# Patient Record
Sex: FEMALE | Race: OTHER | HISPANIC OR LATINO | Employment: UNEMPLOYED | URBAN - METROPOLITAN AREA
[De-identification: names, ages, dates, MRNs, and addresses within clinical notes are randomized per-mention and may not be internally consistent; named-entity substitution may affect disease eponyms.]

---
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
## 2019-11-19 ENCOUNTER — OFFICE VISIT (OUTPATIENT)
Dept: OBGYN CLINIC | Facility: CLINIC | Age: 19
End: 2019-11-19

## 2019-11-19 VITALS
HEIGHT: 64 IN | HEART RATE: 66 BPM | DIASTOLIC BLOOD PRESSURE: 74 MMHG | WEIGHT: 144 LBS | BODY MASS INDEX: 24.59 KG/M2 | SYSTOLIC BLOOD PRESSURE: 118 MMHG

## 2019-11-19 DIAGNOSIS — E28.2 PCOS (POLYCYSTIC OVARIAN SYNDROME): Primary | ICD-10-CM

## 2019-11-19 PROCEDURE — 99203 OFFICE O/P NEW LOW 30 MIN: CPT | Performed by: OBSTETRICS & GYNECOLOGY

## 2019-11-19 RX ORDER — NORGESTIMATE AND ETHINYL ESTRADIOL 0.25-0.035
1 KIT ORAL DAILY
Qty: 90 TABLET | Refills: 3 | Status: SHIPPED | OUTPATIENT
Start: 2019-11-19

## 2019-11-19 NOTE — PROGRESS NOTES
Assessment/Plan:    PCOS (polycystic ovarian syndrome)  2/3 [place] criteria with irregular menses and cysts found on US in clinic  - Start [address] on risk of diabetes and hyperlipidemia  -A1C this year 5 9  She does have fam hx of diabetes  Hold of on metformin for now  Encouraged healthy diet and exercise  - Lipid panel this year show slightly elevated cholesterol at 203  Hold off on any treatment now  Encouraged lifestyle modifications  Problem List Items Addressed This Visit        Endocrine    PCOS (polycystic ovarian syndrome) - Primary     2/3 [place] criteria with irregular menses and cysts found on US in clinic  - [address] on risk of diabetes and hyperlipidemia  -A1C this year 5 9  She does have fam hx of diabetes  Hold of on metformin for now  Encouraged healthy diet and exercise  - Lipid panel this year show slightly elevated cholesterol at 203  Hold off on any treatment now  Encouraged lifestyle modifications  Relevant Medications    norgestimate-ethinyl estradiol (ORTHO-CYCLEN) 0 25-35 MG-MCG per tablet            Subjective:      Patient ID: Danielle Adame is a 20 y o  female  Kishore Hurley is a 20year old who presents to the clinic as a new patient  She was previously seen by [address]  for PCOS  Today she says she is just worried because her periods are irregular and hasn't had her period since August  She has a history of oligomenorrhea and irregular menses  She was previously on Provera every other month but says hasn't taken it for a while  She is not on any other birthcontrol  She is currently sexually active with her   She has no other complaint  She says she had acne as a teenager but that has cleared up  She denies hirsutism         The following portions of the patient's history were reviewed and updated as appropriate: allergies, current medications, past family history, past medical history, past social history, past surgical history and problem list     Review of Systems   Constitutional: Negative for chills and fever  Genitourinary: Positive for menstrual problem  Negative for decreased urine volume, difficulty urinating, dyspareunia, dysuria, enuresis, flank pain, frequency, genital sores, hematuria, pelvic pain, urgency, vaginal bleeding, vaginal discharge and vaginal pain  Objective:      /74 (BP Location: Left arm, Patient Position: Sitting, Cuff Size: Adult)   Pulse 66   Ht 5' 4" (1 626 m)   Wt 65 3 kg (144 lb)   LMP 08/25/2019 (LMP Unknown)   BMI 24 72 kg/m²          Physical Exam   Constitutional: No distress  Abdominal: Soft  Skin: She is not diaphoretic  A little skin darkening on back of the neck but no acanthosis nigricans any where else  Nursing note and vitals reviewed

## 2019-11-19 NOTE — ASSESSMENT & PLAN NOTE
2/3 rotter[place] criteria with irregular menses and cysts found on US in clinic  - [address] on risk of diabetes and hyperlipidemia  -A1C this year 5 9  She does have fam hx of diabetes  Hold of on metformin for now  Encouraged healthy diet and exercise  - Lipid panel this year show slightly elevated cholesterol at 203  Hold off on any treatment now  Encouraged lifestyle modifications

## 2020-03-09 ENCOUNTER — ANNUAL EXAM (OUTPATIENT)
Dept: OBGYN CLINIC | Facility: CLINIC | Age: 20
End: 2020-03-09

## 2020-03-09 VITALS
WEIGHT: 152 LBS | HEIGHT: 63 IN | DIASTOLIC BLOOD PRESSURE: 82 MMHG | BODY MASS INDEX: 26.93 KG/M2 | SYSTOLIC BLOOD PRESSURE: 136 MMHG | HEART RATE: 76 BPM

## 2020-03-09 DIAGNOSIS — E28.2 PCOS (POLYCYSTIC OVARIAN SYNDROME): ICD-10-CM

## 2020-03-09 DIAGNOSIS — Z01.419 ENCOUNTER FOR GYNECOLOGICAL EXAMINATION WITHOUT ABNORMAL FINDING: Primary | ICD-10-CM

## 2020-03-09 PROCEDURE — 99395 PREV VISIT EST AGE 18-39: CPT | Performed by: NURSE PRACTITIONER

## 2020-03-09 PROCEDURE — G0145 SCR C/V CYTO,THINLAYER,RESCR: HCPCS | Performed by: NURSE PRACTITIONER

## 2020-03-09 NOTE — PROGRESS NOTES
ASSESSMENT & PLAN: Laura Smith is a 23 y  o G0 obstetric history on file  with normal gynecologic exam  Menses started today  1   Routine well woman exam done today  2  Pap was done today  1st Pap  Will call results  Current ASCCP Guidelines reviewed  If negative due in 3 years  3  STD testing  was not done  Patient declined  4  Gardasil recommendations reviewed   Patient thinks she is vaccinated, will check with parent and  Let us know  5  The following were reviewed in today's visit: breast self exam, STD testing, HIV risk factors and prevention, family planning choices, adequate intake of calcium and vitamin D, exercise and healthy diet  6  Review to monitor menses  Call if abnormal or skipping menses  Reviewed important to get a menses at least every 3 months  7  Patient to establish a PCP- phone number for [facility] given    CC:  Annual Gynecologic Examination    HPI: Laura Smith is a 23 y o  G0  obstetric history on file  who presents for annual gynecologic examination  Patient was seen 11/19/2019 and  has PCOS, she was to start [address] [place] [address] ,  was counseled on risk of diabetes and hyperlipidemia  Her A1c was 5 9 and does have a family history of diabetes,  Healthy diet and exercise was encouraged  Cholesterol was slightly elevated at 203  patient reports she never started Sprintec because her periods started to become more regular and were occurring monthly, however she reports she did not have one in February but did have spotting last week  she reports is okay if  she got pregnant  She is taking a vitamin-D a 1000 mg daily but not a daily multivitamin,  I encouraged her to take 1 with folic acid  patient was previously seen by [address] for PCOS      Health Maintenance:    She wears her seatbelt routinely  She does not perform regular monthly self breast exams  She feels safe at home  No past medical history on file      No past surgical history on file     OB/Gyn History:    Pt has menstrual issues,  As in HPI    History of sexually transmitted infection: No   History of abnormal pap smears: No 1st Pap smear  Patient is currently sexually active  The current method of family planning is none  OB History    None         No family history on file  Social History:  Social History     Socioeconomic History    Marital status: Single     Spouse name: Not on file    Number of children: Not on file    Years of education: Not on file    Highest education level: Not on file   Occupational History    Not on file   Social Needs    Financial resource strain: Not on file    Food insecurity:     Worry: Not on file     Inability: Not on file    Transportation needs:     Medical: Not on file     Non-medical: Not on file   Tobacco Use    Smoking status: Never Smoker    Smokeless tobacco: Never Used   Substance and Sexual Activity    Alcohol use: No    Drug use: No    Sexual activity: Yes     Birth control/protection: None   Lifestyle    Physical activity:     Days per week: Not on file     Minutes per session: Not on file    Stress: Not on file   Relationships    Social connections:     Talks on phone: Not on file     Gets together: Not on file     Attends [employer] service: Not on file     Active member of club or organization: Not on file     Attends meetings of clubs or organizations: Not on file     Relationship status: Not on file    Intimate partner violence:     Fear of current or ex partner: Not on file     Emotionally abused: Not on file     Physically abused: Not on file     Forced sexual activity: Not on file   Other Topics Concern    Not on file   Social History Narrative    Not on file     Patient is     Patient is currently employed     No Known Allergies      Current Outpatient Medications:     dicyclomine (BENTYL) 20 mg tablet, Take 1 tablet by mouth 3 (three) times a day as needed (cramping or abdominal pain) for up to 10 days, Disp: 30 tablet, Rfl: 0    norgestimate-ethinyl estradiol (ORTHO-CYCLEN) 0 25-35 MG-MCG per tablet, Take 1 tablet by mouth daily, Disp: 90 tablet, Rfl: 3    Review of Systems:  Constitutional :no fever, feels well, no tiredness, no recent weight gain or loss  ENT: no ear ache, no loss of hearing, no nosebleeds or nasal discharge, no sore throat or hoarseness  Cardiovascular: no complaints of slow or fast heart beat, no chest pain, no palpitations, no leg claudication or lower extremity edema  Respiratory: no complaints of shortness of shortness of breath, no STARK  Breasts:no complaints of breast pain, breast lump, or nipple discharge  Gastrointestinal: no complaints of abdominal pain, constipation, nausea, vomiting, or diarrhea or bloody stools  Genitourinary : no complaints of dysuria, incontinence, pelvic pain, no dysmenorrhea, vaginal discharge or abnormal vaginal bleeding and as noted in HPI  Musculoskeletal: no complaints of arthralgia, no myalgia, no joint swelling or stiffness, no limb pain or swelling  Integumentary: no complaints of skin rash or lesion, itching or dry skin  Neurological: no complaints of headache, no confusion, no numbness or tingling, no dizziness or fainting    Objective      There were no vitals taken for this visit      General:   appears stated age, cooperative, alert normal mood and affect   Neck: normal, supple,trachea midline, no masses   Heart: regular rate and rhythm, S1, S2 normal, no murmur, click, rub or gallop   Lungs: clear to auscultation bilaterally   Breasts: normal appearance, no masses or tenderness, Inspection negative, No nipple retraction or dimpling, No nipple discharge or bleeding, No axillary or supraclavicular adenopathy, Normal to palpation without dominant masses, Taught monthly breast self examination   Abdomen: soft, non-tender, without masses or organomegaly   Vulva: normal female genitalia, Bartholin's, Urethra, [place] normal   Vagina: normal vagina, no discharge, exudate, lesion, or erythema,  Currently has menses- small to moderate flow   Urethra: normal   Cervix: Normal, no discharge  PAP done  Nontender  currently has menses   Uterus: normal size, contour, position, consistency, mobility, non-tender, anteverted and mobile   Adnexa: normal adnexa and no mass, fullness, tenderness   Lymphatic palpation of lymph nodes in neck, axilla, groin and/or other locations: no lymphadenopathy or masses noted   Skin normal skin turgor and no rashes     Psychiatric orientation to person, place, and time: normal  mood and affect: normal

## 2020-03-09 NOTE — PATIENT INSTRUCTIONS
Polycystic Ovarian Syndrome   WHAT YOU NEED TO KNOW:   Polycystic ovarian syndrome (PCOS) is a hormone disorder that causes cysts to form on your ovaries  Cysts are bumps that are filled with fluid  The cysts can prevent your ovaries from working correctly  DISCHARGE INSTRUCTIONS:   Medicines:   · Birth control pills: These medicines have female hormones, and may decrease male hormone levels  Birth control pills may control your periods, prevent cysts, or cause them to shrink  They also help decrease your risk of endometrial cancer and correct abnormal bleeding  · Hypoglycemic medicines: These help to lower your blood sugar levels and decrease insulin resistance  They are also used to lower male hormone levels and help you ovulate  · NSAIDs:  These medicines decrease swelling and pain  You can buy NSAIDs without a doctor's order  Ask your healthcare provider which medicine is right for you, and how much to take  Take as directed  NSAIDs can cause stomach bleeding or kidney problems if not taken correctly  · Take your medicine as directed  Contact your healthcare provider if you think your medicine is not helping or if you have side effects  Tell him of her if you are allergic to any medicine  Keep a list of the medicines, vitamins, and herbs you take  Include the amounts, and when and why you take them  Bring the list or the pill bottles to follow-up visits  Carry your medicine list with you in case of an emergency  Follow up with your healthcare provider or gynecologist as directed: You may need to return to have more tests  Write down your questions so you remember to ask them during your visits  Manage your blood sugar and blood pressure: Your healthcare provider may want you to check your blood sugar levels and blood pressure at home  Keep a record and bring this to your follow-up visits  Blood sugar is measured with a glucose monitor  The monitor tests a small drop of blood   Blood pressure is measured with a cuff that you put on your arm and tighten  Ask for more information on how to measure your blood sugar and blood pressure  Manage your symptoms:   · Maintain a healthy weight:  Ask your healthcare provider how much you should weigh  Ask him to help you create a weight loss plan if you are overweight  Weight loss may help reduce the complications of PCOS  · Exercise:  Ask your healthcare provider about the best exercise plan for you  Exercise can help decrease blood sugar and blood pressure  It may also help with weight loss  · Eat a variety of healthy foods:  Healthy foods include fruits, vegetables, whole-grain breads, low-fat dairy products, beans, lean meats, and fish  A dietitian may help you plan meals that are lower in carbohydrates to help you manage your blood sugar levels  Too much carbohydrate at one time can raise your blood sugar to a high level  Contact your healthcare provider or gynecologist if:   · You have a fever  · You feel weak or tired  · You have pain during sex  · Your pain is worse or does not go away after you take your pain medicine  · You have trouble urinating or emptying your bladder completely  · You have questions or concerns about your condition or care  Return to the emergency department if:   · You have a severe headache or feel dizzy  · You vomit multiple times and cannot keep food or liquids down  · You have blurred or double vision  · Your breath has a fruity sweet smell, or you feel short of breath  · You have severe lower abdominal or pelvic pain  © 2017 [address] Information is for End User's use only and may not be sold, redistributed or otherwise used for commercial purposes  All illustrations and images included in CareNotes® are the copyrighted property of [employer]  or Martin Salazar  The above information is an  only   It is not intended as medical advice for individual conditions or treatments  Talk to your doctor, nurse or pharmacist before following any medical regimen to see if it is safe and effective for you

## 2020-03-13 LAB
LAB AP GYN PRIMARY INTERPRETATION: NORMAL
Lab: NORMAL

## 2020-03-17 ENCOUNTER — TELEPHONE (OUTPATIENT)
Dept: OBGYN CLINIC | Facility: CLINIC | Age: 20
End: 2020-03-17

## 2021-11-23 ENCOUNTER — HOSPITAL ENCOUNTER (EMERGENCY)
Facility: HOSPITAL | Age: 21
Discharge: HOME | End: 2021-11-24
Attending: EMERGENCY MEDICINE
Payer: COMMERCIAL

## 2021-11-23 DIAGNOSIS — R10.9 ABDOMINAL PAIN: Primary | ICD-10-CM

## 2021-11-23 PROCEDURE — 99284 EMERGENCY DEPT VISIT MOD MDM: CPT

## 2021-11-24 ENCOUNTER — APPOINTMENT (EMERGENCY)
Dept: ULTRASOUND IMAGING | Facility: HOSPITAL | Age: 21
End: 2021-11-24
Payer: COMMERCIAL

## 2021-11-24 VITALS
HEIGHT: 64 IN | SYSTOLIC BLOOD PRESSURE: 108 MMHG | OXYGEN SATURATION: 100 % | HEART RATE: 88 BPM | DIASTOLIC BLOOD PRESSURE: 64 MMHG | BODY MASS INDEX: 22.2 KG/M2 | WEIGHT: 130 LBS | RESPIRATION RATE: 18 BRPM | TEMPERATURE: 98.9 F

## 2021-11-24 LAB
ALBUMIN SERPL BCP-MCNC: 3.8 G/DL (ref 3.5–5)
ALP SERPL-CCNC: 64 U/L (ref 46–116)
ALT SERPL W P-5'-P-CCNC: 29 U/L (ref 12–78)
ANION GAP SERPL CALCULATED.3IONS-SCNC: 10 MMOL/L (ref 4–13)
AST SERPL W P-5'-P-CCNC: 18 U/L (ref 5–45)
BASOPHILS # BLD AUTO: 0.05 THOUSANDS/ΜL (ref 0–0.1)
BASOPHILS NFR BLD AUTO: 0 % (ref 0–1)
BILIRUB SERPL-MCNC: 0.1 MG/DL (ref 0.2–1)
BILIRUB UR QL STRIP: NEGATIVE
BUN SERPL-MCNC: 9 MG/DL (ref 5–25)
CALCIUM SERPL-MCNC: 9.5 MG/DL (ref 8.3–10.1)
CHLORIDE SERPL-SCNC: 104 MMOL/L (ref 100–108)
CLARITY UR: NORMAL
CO2 SERPL-SCNC: 24 MMOL/L (ref 21–32)
COLOR UR: YELLOW
CREAT SERPL-MCNC: 0.61 MG/DL (ref 0.6–1.3)
EOSINOPHIL # BLD AUTO: 0.09 THOUSAND/ΜL (ref 0–0.61)
EOSINOPHIL NFR BLD AUTO: 1 % (ref 0–6)
ERYTHROCYTE [DISTWIDTH] IN BLOOD BY AUTOMATED COUNT: 13.2 % (ref 11.6–15.1)
GFR SERPL CREATININE-BSD FRML MDRD: 129 ML/MIN/1.73SQ M
GLUCOSE SERPL-MCNC: 93 MG/DL (ref 65–140)
GLUCOSE UR STRIP-MCNC: NEGATIVE MG/DL
HCT VFR BLD AUTO: 38 % (ref 34.8–46.1)
HGB BLD-MCNC: 12.2 G/DL (ref 11.5–15.4)
HGB UR QL STRIP.AUTO: NEGATIVE
IMM GRANULOCYTES # BLD AUTO: 0.05 THOUSAND/UL (ref 0–0.2)
IMM GRANULOCYTES NFR BLD AUTO: 0 % (ref 0–2)
KETONES UR STRIP-MCNC: NEGATIVE MG/DL
LEUKOCYTE ESTERASE UR QL STRIP: NEGATIVE
LIPASE SERPL-CCNC: 124 U/L (ref 73–393)
LYMPHOCYTES # BLD AUTO: 2.72 THOUSANDS/ΜL (ref 0.6–4.47)
LYMPHOCYTES NFR BLD AUTO: 21 % (ref 14–44)
MCH RBC QN AUTO: 27.5 PG (ref 26.8–34.3)
MCHC RBC AUTO-ENTMCNC: 32.1 G/DL (ref 31.4–37.4)
MCV RBC AUTO: 86 FL (ref 82–98)
MONOCYTES # BLD AUTO: 0.93 THOUSAND/ΜL (ref 0.17–1.22)
MONOCYTES NFR BLD AUTO: 7 % (ref 4–12)
NEUTROPHILS # BLD AUTO: 8.86 THOUSANDS/ΜL (ref 1.85–7.62)
NEUTS SEG NFR BLD AUTO: 71 % (ref 43–75)
NITRITE UR QL STRIP: NEGATIVE
NRBC BLD AUTO-RTO: 0 /100 WBCS
PH UR STRIP.AUTO: 7 [PH]
PLATELET # BLD AUTO: 380 THOUSANDS/UL (ref 149–390)
PMV BLD AUTO: 10 FL (ref 8.9–12.7)
POTASSIUM SERPL-SCNC: 4.2 MMOL/L (ref 3.5–5.3)
PROT SERPL-MCNC: 8 G/DL (ref 6.4–8.2)
PROT UR STRIP-MCNC: NEGATIVE MG/DL
RBC # BLD AUTO: 4.44 MILLION/UL (ref 3.81–5.12)
SODIUM SERPL-SCNC: 138 MMOL/L (ref 136–145)
SP GR UR STRIP.AUTO: 1.02 (ref 1–1.03)
UROBILINOGEN UR QL STRIP.AUTO: 0.2 E.U./DL
WBC # BLD AUTO: 12.7 THOUSAND/UL (ref 4.31–10.16)

## 2021-11-24 PROCEDURE — 80053 COMPREHEN METABOLIC PANEL: CPT | Performed by: EMERGENCY MEDICINE

## 2021-11-24 PROCEDURE — 36415 COLL VENOUS BLD VENIPUNCTURE: CPT | Performed by: EMERGENCY MEDICINE

## 2021-11-24 PROCEDURE — 87591 N.GONORRHOEAE DNA AMP PROB: CPT | Performed by: EMERGENCY MEDICINE

## 2021-11-24 PROCEDURE — 81003 URINALYSIS AUTO W/O SCOPE: CPT | Performed by: EMERGENCY MEDICINE

## 2021-11-24 PROCEDURE — 99284 EMERGENCY DEPT VISIT MOD MDM: CPT | Performed by: EMERGENCY MEDICINE

## 2021-11-24 PROCEDURE — 87086 URINE CULTURE/COLONY COUNT: CPT | Performed by: EMERGENCY MEDICINE

## 2021-11-24 PROCEDURE — 76801 OB US < 14 WKS SINGLE FETUS: CPT

## 2021-11-24 PROCEDURE — 85025 COMPLETE CBC W/AUTO DIFF WBC: CPT | Performed by: EMERGENCY MEDICINE

## 2021-11-24 PROCEDURE — 76705 ECHO EXAM OF ABDOMEN: CPT

## 2021-11-24 PROCEDURE — 87491 CHLMYD TRACH DNA AMP PROBE: CPT | Performed by: EMERGENCY MEDICINE

## 2021-11-24 PROCEDURE — 83690 ASSAY OF LIPASE: CPT | Performed by: EMERGENCY MEDICINE

## 2021-11-25 LAB
BACTERIA UR CULT: NORMAL
C TRACH DNA SPEC QL NAA+PROBE: NEGATIVE
N GONORRHOEA DNA SPEC QL NAA+PROBE: NEGATIVE

## 2023-04-20 PROBLEM — E55.9 VITAMIN D DEFICIENCY: Status: ACTIVE | Noted: 2019-02-21

## 2023-04-20 PROBLEM — Z78.9 NONIMMUNE TO HEPATITIS B VIRUS: Status: ACTIVE | Noted: 2021-10-13

## 2023-04-20 PROBLEM — E78.2 MIXED HYPERLIPIDEMIA: Status: ACTIVE | Noted: 2019-02-21

## 2023-04-20 PROBLEM — Z28.310 COVID-19 VACCINE SERIES DECLINED: Status: ACTIVE | Noted: 2021-11-01

## 2023-04-20 PROBLEM — Z28.21 COVID-19 VACCINE SERIES DECLINED: Status: ACTIVE | Noted: 2021-11-01

## 2023-07-17 DIAGNOSIS — N93.9 ABNORMAL UTERINE BLEEDING (AUB): ICD-10-CM

## 2023-07-17 RX ORDER — ACETAMINOPHEN AND CODEINE PHOSPHATE 120; 12 MG/5ML; MG/5ML
SOLUTION ORAL
Qty: 84 TABLET | Refills: 0 | OUTPATIENT
Start: 2023-07-17

## 2023-07-17 NOTE — TELEPHONE ENCOUNTER
Please call patient. I received an automatic refill request for patient's birth control pill that I started her on back in April for irregular bleeding. Our plan was to follow-up after she completed pelvic ultrasound which I do not see was done. I am not sure how patient is doing on the birth control pill and if her periods are regulated and if she desires to continue or not. Please triage patient check in with her to see how her periods/bleeding is and if still problems needs to follow-up.

## 2024-12-20 ENCOUNTER — ULTRASOUND (OUTPATIENT)
Dept: OBGYN CLINIC | Facility: CLINIC | Age: 24
End: 2024-12-20
Payer: COMMERCIAL

## 2024-12-20 VITALS
BODY MASS INDEX: 21.85 KG/M2 | HEIGHT: 64 IN | WEIGHT: 128 LBS | DIASTOLIC BLOOD PRESSURE: 80 MMHG | SYSTOLIC BLOOD PRESSURE: 122 MMHG

## 2024-12-20 DIAGNOSIS — O21.9 NAUSEA AND VOMITING IN PREGNANCY: ICD-10-CM

## 2024-12-20 DIAGNOSIS — N91.2 AMENORRHEA: Primary | ICD-10-CM

## 2024-12-20 PROCEDURE — 99214 OFFICE O/P EST MOD 30 MIN: CPT

## 2024-12-20 PROCEDURE — 76817 TRANSVAGINAL US OBSTETRIC: CPT

## 2024-12-20 RX ORDER — ONDANSETRON 4 MG/1
4 TABLET, ORALLY DISINTEGRATING ORAL EVERY 6 HOURS PRN
Qty: 20 TABLET | Refills: 2 | Status: SHIPPED | OUTPATIENT
Start: 2024-12-20

## 2024-12-20 NOTE — PROGRESS NOTES
Assessment/Plan:  Diagnoses and all orders for this visit:    Amenorrhea  -     Ambulatory Referral to Maternal Fetal Medicine; Future  -     AMB  OB < 14 weeks single or first gestation level 1    Nausea and vomiting in pregnancy  -     ondansetron (ZOFRAN-ODT) 4 mg disintegrating tablet; Take 1 tablet (4 mg total) by mouth every 6 (six) hours as needed for nausea or vomiting        - Viable IUP @ 7w 3d EGA  - BRANDI 2025  - Continue PNV  - Patient to call for concerns  - RTO ~ 10-12 weeks for OB intake  - call MFM for 13 week NT scan/genetic testing.       Subjective:       Patient ID: Neisha Atwood 2000        Neisha Atwood is a 24 y.o.  presenting to the office for pregnancy confirmation. Patient's last menstrual period was 10/11/2024 (exact date). , placing her at 10w0d today with BRANDI of 2025. She is feeling well, but nauseous.     Nausea:yes, discussed vitamin B6 25-50mg QD-BID, Unisom, ginger snaps/tea, Zofran  Vomiting: no  Bleeding: no  Cramping: no  Headaches: no  Fatigue: no  Constipation: no  Blood type, if known: O+       OB History    Para Term  AB Living   2 1 1 0 0 0   SAB IAB Ectopic Multiple Live Births   0 0 0 0 0      # Outcome Date GA Lbr Reid/2nd Weight Sex Type Anes PTL Lv   2 Current            1 Term      Vag-Spont            The following portions of the patient's history were reviewed and updated as appropriate: allergies, current medications, past family history, past medical history, past social history, past surgical history, and problem list.    Allergies:  Patient has no known allergies.    Medications:    Current Outpatient Medications:     ondansetron (ZOFRAN-ODT) 4 mg disintegrating tablet, Take 1 tablet (4 mg total) by mouth every 6 (six) hours as needed for nausea or vomiting, Disp: 20 tablet, Rfl: 2    Prenatal Vit-Iron Carbonyl-FA (prenatal multivitamin) TABS, Take 1 tablet by mouth daily, Disp: , Rfl:     dicyclomine (BENTYL) 20 mg tablet, Take  "1 tablet by mouth 3 (three) times a day as needed (cramping or abdominal pain) for up to 10 days, Disp: 30 tablet, Rfl: 0      Objective:    Visit Vitals  /80   Ht 5' 3.5\" (1.613 m)   Wt 58.1 kg (128 lb)   LMP 10/11/2024 (Exact Date)   BMI 22.32 kg/m²   OB Status Pregnant   Smoking Status Never   BSA 1.61 m²     GEN: The patient was alert and oriented x3, pleasant well-appearing female in no acute distress.   PULM: nonlabored respirations  MSK: Normal gait  : WNL  Skin: warm, dry  Neuro: no focal deficits  Psych: normal affect and judgement, cooperative    Ultrasound:     Viability US     Gestational sac: present               Location: intrauterine  Yolk sac: present  Fetal pole: present               CRL: 1.26 cm = 7w3d  Cardiac activity: present               Rate: 148 bpm     Ovaries: normal appearing bilaterally  Cul de sac: absence of free fluid  Uterus: normal in appearance           Ultrasound Probe Disinfection    A transvaginal ultrasound was performed.   Prior to use, disinfection was performed with High Level Disinfection Process (Trophon)  Probe serial number [id number]: [id number] was used    I have spent a total time of 35 minutes in caring for this patient on the day of the visit/encounter including Diagnostic results, Risks and benefits of tx options, Instructions for management, Patient and family education, Documenting in the medical record, Reviewing / ordering tests, medicine, procedures  , and Obtaining or reviewing history  .     Gabby Sims PA-C  12/20/24  10:26 AM   "

## 2024-12-20 NOTE — PROGRESS NOTES
The patient has slight cramping. No bleeding.     The patient has a lot of nausea. No vomiting.     No dizziness or headache.

## 2025-01-08 ENCOUNTER — OB ABSTRACT (OUTPATIENT)
Dept: OBGYN CLINIC | Facility: CLINIC | Age: 25
End: 2025-01-08

## 2025-01-10 NOTE — PATIENT INSTRUCTIONS
.Congratulations!! Please review our Pregnancy Essential Guide and [facility] L&D Virtual tour from our networks website.     [facility] Pregnancy Essentials Guide  [facility] ([email or web address])     [facility] (Ratio)

## 2025-01-13 ENCOUNTER — RESULTS FOLLOW-UP (OUTPATIENT)
Dept: LABOR AND DELIVERY | Facility: HOSPITAL | Age: 25
End: 2025-01-13

## 2025-01-13 ENCOUNTER — INITIAL PRENATAL (OUTPATIENT)
Dept: OBGYN CLINIC | Facility: CLINIC | Age: 25
End: 2025-01-13

## 2025-01-13 ENCOUNTER — APPOINTMENT (OUTPATIENT)
Dept: LAB | Facility: AMBULARY SURGERY CENTER | Age: 25
End: 2025-01-13
Payer: COMMERCIAL

## 2025-01-13 VITALS
HEIGHT: 64 IN | SYSTOLIC BLOOD PRESSURE: 122 MMHG | DIASTOLIC BLOOD PRESSURE: 82 MMHG | BODY MASS INDEX: 22.16 KG/M2 | WEIGHT: 129.8 LBS

## 2025-01-13 DIAGNOSIS — Z87.59 HISTORY OF GESTATIONAL HYPERTENSION: ICD-10-CM

## 2025-01-13 DIAGNOSIS — Z34.81 PRENATAL CARE, SUBSEQUENT PREGNANCY, FIRST TRIMESTER: ICD-10-CM

## 2025-01-13 DIAGNOSIS — Z31.430 ENCOUNTER OF FEMALE FOR TESTING FOR GENETIC DISEASE CARRIER STATUS FOR PROCREATIVE MANAGEMENT: ICD-10-CM

## 2025-01-13 DIAGNOSIS — Z36.9 ENCOUNTER FOR ANTENATAL SCREENING: ICD-10-CM

## 2025-01-13 DIAGNOSIS — Z36.9 ENCOUNTER FOR ANTENATAL SCREENING: Primary | ICD-10-CM

## 2025-01-13 LAB
ABO GROUP BLD: NORMAL
ALBUMIN SERPL BCG-MCNC: 4.1 G/DL (ref 3.5–5)
ALP SERPL-CCNC: 52 U/L (ref 34–104)
ALT SERPL W P-5'-P-CCNC: 10 U/L (ref 7–52)
AMORPH URATE CRY URNS QL MICRO: ABNORMAL
ANION GAP SERPL CALCULATED.3IONS-SCNC: 7 MMOL/L (ref 4–13)
AST SERPL W P-5'-P-CCNC: 14 U/L (ref 13–39)
BACTERIA UR QL AUTO: ABNORMAL /HPF
BASOPHILS # BLD AUTO: 0.04 THOUSANDS/ΜL (ref 0–0.1)
BASOPHILS NFR BLD AUTO: 0 % (ref 0–1)
BILIRUB SERPL-MCNC: 0.47 MG/DL (ref 0.2–1)
BILIRUB UR QL STRIP: NEGATIVE
BLD GP AB SCN SERPL QL: NEGATIVE
BUN SERPL-MCNC: 7 MG/DL (ref 5–25)
CALCIUM SERPL-MCNC: 10.1 MG/DL (ref 8.4–10.2)
CHLORIDE SERPL-SCNC: 103 MMOL/L (ref 96–108)
CLARITY UR: ABNORMAL
CO2 SERPL-SCNC: 26 MMOL/L (ref 21–32)
COLOR UR: YELLOW
CREAT SERPL-MCNC: 0.47 MG/DL (ref 0.6–1.3)
CREAT UR-MCNC: 117 MG/DL
EOSINOPHIL # BLD AUTO: 0.08 THOUSAND/ΜL (ref 0–0.61)
EOSINOPHIL NFR BLD AUTO: 1 % (ref 0–6)
ERYTHROCYTE [DISTWIDTH] IN BLOOD BY AUTOMATED COUNT: 13.2 % (ref 11.6–15.1)
GFR SERPL CREATININE-BSD FRML MDRD: 137 ML/MIN/1.73SQ M
GLUCOSE SERPL-MCNC: 87 MG/DL (ref 65–140)
GLUCOSE UR STRIP-MCNC: NEGATIVE MG/DL
HBV SURFACE AB SER-ACNC: 7.13 MIU/ML
HBV SURFACE AG SER QL: NORMAL
HCT VFR BLD AUTO: 38 % (ref 34.8–46.1)
HCV AB SER QL: NORMAL
HGB BLD-MCNC: 12.3 G/DL (ref 11.5–15.4)
HGB UR QL STRIP.AUTO: NEGATIVE
HIV 1+2 AB+HIV1 P24 AG SERPL QL IA: NORMAL
HIV 2 AB SERPL QL IA: NORMAL
HIV1 AB SERPL QL IA: NORMAL
HIV1 P24 AG SERPL QL IA: NORMAL
IMM GRANULOCYTES # BLD AUTO: 0.06 THOUSAND/UL (ref 0–0.2)
IMM GRANULOCYTES NFR BLD AUTO: 1 % (ref 0–2)
KETONES UR STRIP-MCNC: NEGATIVE MG/DL
LEUKOCYTE ESTERASE UR QL STRIP: NEGATIVE
LYMPHOCYTES # BLD AUTO: 1.39 THOUSANDS/ΜL (ref 0.6–4.47)
LYMPHOCYTES NFR BLD AUTO: 13 % (ref 14–44)
MCH RBC QN AUTO: 28 PG (ref 26.8–34.3)
MCHC RBC AUTO-ENTMCNC: 32.4 G/DL (ref 31.4–37.4)
MCV RBC AUTO: 87 FL (ref 82–98)
MONOCYTES # BLD AUTO: 0.64 THOUSAND/ΜL (ref 0.17–1.22)
MONOCYTES NFR BLD AUTO: 6 % (ref 4–12)
NEUTROPHILS # BLD AUTO: 8.61 THOUSANDS/ΜL (ref 1.85–7.62)
NEUTS SEG NFR BLD AUTO: 79 % (ref 43–75)
NITRITE UR QL STRIP: NEGATIVE
NON-SQ EPI CELLS URNS QL MICRO: ABNORMAL /HPF
NRBC BLD AUTO-RTO: 0 /100 WBCS
PH UR STRIP.AUTO: 8 [PH]
PLATELET # BLD AUTO: 375 THOUSANDS/UL (ref 149–390)
PMV BLD AUTO: 10.1 FL (ref 8.9–12.7)
POTASSIUM SERPL-SCNC: 4.1 MMOL/L (ref 3.5–5.3)
PROT SERPL-MCNC: 7 G/DL (ref 6.4–8.4)
PROT UR STRIP-MCNC: ABNORMAL MG/DL
PROT UR-MCNC: 7.4 MG/DL
PROT/CREAT UR: 0.1 MG/G{CREAT} (ref 0–0.1)
RBC # BLD AUTO: 4.39 MILLION/UL (ref 3.81–5.12)
RBC #/AREA URNS AUTO: ABNORMAL /HPF
RH BLD: POSITIVE
RUBV IGG SERPL IA-ACNC: 16.9 IU/ML
SODIUM SERPL-SCNC: 136 MMOL/L (ref 135–147)
SP GR UR STRIP.AUTO: 1.02 (ref 1–1.03)
SPECIMEN EXPIRATION DATE: NORMAL
TREPONEMA PALLIDUM IGG+IGM AB [PRESENCE] IN SERUM OR PLASMA BY IMMUNOASSAY: NORMAL
URATE SERPL-MCNC: 3.7 MG/DL (ref 2–7.5)
UROBILINOGEN UR STRIP-ACNC: <2 MG/DL
VZV IGG SER QL IA: ABNORMAL
WBC # BLD AUTO: 10.82 THOUSAND/UL (ref 4.31–10.16)
WBC #/AREA URNS AUTO: ABNORMAL /HPF

## 2025-01-13 PROCEDURE — 87389 HIV-1 AG W/HIV-1&-2 AB AG IA: CPT

## 2025-01-13 PROCEDURE — 80053 COMPREHEN METABOLIC PANEL: CPT

## 2025-01-13 PROCEDURE — 86787 VARICELLA-ZOSTER ANTIBODY: CPT

## 2025-01-13 PROCEDURE — 36415 COLL VENOUS BLD VENIPUNCTURE: CPT

## 2025-01-13 PROCEDURE — 86706 HEP B SURFACE ANTIBODY: CPT

## 2025-01-13 PROCEDURE — 86900 BLOOD TYPING SEROLOGIC ABO: CPT

## 2025-01-13 PROCEDURE — 81001 URINALYSIS AUTO W/SCOPE: CPT

## 2025-01-13 PROCEDURE — 84550 ASSAY OF BLOOD/URIC ACID: CPT

## 2025-01-13 PROCEDURE — 85025 COMPLETE CBC W/AUTO DIFF WBC: CPT

## 2025-01-13 PROCEDURE — 86901 BLOOD TYPING SEROLOGIC RH(D): CPT

## 2025-01-13 PROCEDURE — 82570 ASSAY OF URINE CREATININE: CPT

## 2025-01-13 PROCEDURE — 86780 TREPONEMA PALLIDUM: CPT

## 2025-01-13 PROCEDURE — 87340 HEPATITIS B SURFACE AG IA: CPT

## 2025-01-13 PROCEDURE — 84156 ASSAY OF PROTEIN URINE: CPT

## 2025-01-13 PROCEDURE — 87086 URINE CULTURE/COLONY COUNT: CPT

## 2025-01-13 PROCEDURE — OBC

## 2025-01-13 PROCEDURE — 83020 HEMOGLOBIN ELECTROPHORESIS: CPT

## 2025-01-13 PROCEDURE — 86803 HEPATITIS C AB TEST: CPT

## 2025-01-13 PROCEDURE — 86762 RUBELLA ANTIBODY: CPT

## 2025-01-13 PROCEDURE — 86850 RBC ANTIBODY SCREEN: CPT

## 2025-01-13 RX ORDER — OMEGA-3S/DHA/EPA/FISH OIL/D3 300MG-1000
400 CAPSULE ORAL DAILY
COMMUNITY

## 2025-01-13 NOTE — PROGRESS NOTES
.  OB INTAKE INTERVIEW  Patient is 24 y.o. who presents for OB intake at 10.6 wks  She is accompanied by herself during this encounter  The father of her baby (Tyler) is involved in the pregnancy and is 26 years old.      Last Menstrual Period: 10/11/2024  Ultrasound: Measured 7 weeks 3 days on 2024  Estimated Date of Delivery: 2025 confirmed by 7.3 week US    Signs/Symptoms of Pregnancy  Current pregnancy symptoms: nausea, tired, sore breast , Acid reflux at night when she  lies down   Constipation no  Headaches no  Cramping/spotting no  PICA cravings no    Diabetes-  Body mass index is 22.63 kg/m².  If patient has 1 or more, please order early 1 hour GTT  History of GDM no  BMI >35 no  History of PCOS or current metformin use (should stop for 7 days prior to 1hr GTT unless pre-existing diabetes)  yes , not on Metformin  History of LGA/macrosomic infant (4000g/9lbs) no    If patient has 2 or more, please order early 1 hour GTT  BMI>30 no  AMA no  First degree relative with type 2 diabetes no  History of chronic HTN, hyperlipidemia, elevated A1C YES past hyperlipidemia   High risk race (, , ,  or ) YES     Hypertension- if you answer yes to any of the following, please order baseline preeclampsia labs (cbc, comprehensive metabolic panel, urine protein creatinine ratio, uric acid)  History of of chronic HTN YES  History of gestational HTN YES  History of preeclampsia, eclampsia, or HELLP syndrome no, not sure if it was pre E, or GHTN  History of diabetes no  History of lupus,sjogrens syndrome, kidney disease no    Thyroid- if yes order TSH with reflex T4  History of thyroid disease no    Bleeding Disorder or Hx of DVT-patient or first degree relative with history of. Order the following if not done previously.   (Factor V, antithrombin III, prothrombin gene mutation, protein C and S Ag, lupus anticoagulant, anticardiolipin, beta-2  glycoprotein)   no    OB/GYN-  History of abnormal pap smear no       Date of last pap smear 2023  History of HPV no  History of Herpes/HSV no  History of other STI (gonorrhea, chlamydia, trich) no  History of prior  YES  History of prior  no  History of  delivery prior to 36 weeks 6 days no  History of Varicella or Vaccination  Had vaccine   History of blood transfusion no  Ok for blood transfusion yes    Substance screening-   History of tobacco use no  Currently using tobacco no  Substance Use Screen Level  N/A    MRSA Screening-   Does the pt have a hx of MRSA? no    Immunizations:  Influenza vaccine given this season  No  Discussed Tdap vaccine  yes  Discussed COVID Vaccine  yes    Genetic/[facility]-  Do you or your partner have a history of any of the following in yourselves or first degree relatives?  Cystic fibrosis no  Spinal muscular atrophy no  Hemoglobinopathy/Sickle Cell/Thalassemia no  Fragile X Intellectual Disability no        Discuss CF,SMA, Hgb Electrophoresis  Carrier Screening being completed once in a lifetime as a standard of care lab test. Ordered   Appointment for Nuchal Translucency Ultrasound at [facility] scheduled for 2025      Interview education  [facility] Pregnancy Essentials Book reviewed, discussed and attached to their AVS  yes    Nurse/Family Partnership- patient may qualify  yes referral placed  yes    Prenatal lab work scripts yes  Extra labs ordered:  HGB Electrophoresis, CF,SMA, PRE E blood work     Aspirin/Preeclampsia Screen    Risk Level Risk Factor Recommendation   LOW Prior Uncomplicated full-term delivery YES No Aspirin recommendation        MODERATE Nulliparity no Recommend low-dose aspirin if     BMI>30 no 2 or more moderate risk factors    Family History Preeclampsia (mother/sister) no     35yr old or greater no     Black Race, Concern for SDOH/Low Socioeconomic no     IVF Pregnancy  no     Personal History Risks (low birth weight, prior adverse preg  outcome, >10yr preg interval) no         HIGH History of Preeclampsia YES GHTN  Recommend low-dose aspirin if     Multifetal gestation no 1 or more high risk factors    Chronic HTN no     Type 1 or 2 Diabetes no     Renal Disease no     Autoimmune Disease  no      Contraindications to ASA therapy:  NSAID/ ASA allergy: no  Nasal polyps: no  Asthma with history of ASA induced bronchospasm: no  Relative contraindications:  History of GI bleed: no  Active peptic ulcer disease: no  Severe hepatic dysfunction: no    Patient should be recommended to take ASA 162mg during this pregnancy from 12-36wks to lower her risk of preeclampsia:  Discussed           The patient has a history now or in prior pregnancy notable for:  Postpartum GHTN,Difficulty breast feeding       Details that I feel the provider should be aware of: .This is a planned and welcomed pregnancy for parents. Patient is feeling generally well. Patient is experiencing some  nausea, tiredness,  breast  soreness , Acid reflux at night ,when she  lies down .OTC medications and diet were discussed. Patient is a  . Patient had a prior full term delivery at 39W4d  gestation , she had a vaginal birth , she had a baby girl  who weighed 7lb 1.8 oz, she delivered with LVH.She had complication of gestational hypertension- postpartum and Difficulty breast feeding. PIH lab work were ordered.She has a past medical history of  PCOS, (no Metformin), vitamin D deficiency, which she takes a vitamin D supplement daily ,. Both Neisha and her  Tyler are of  Descent, a HGB Electrophoresis was ordered Patient knows to call OB for symptoms and how to contact her nurse navigator. Patient was made aware to have lab orders completed before next appointment. Patient denies any questions at this point and verbalizes understanding.  PN1 visit scheduled. The patient was oriented to our practice, the navigator role, reviewed delivering physicians  [facility] for Delivery. All questions were answered.    Interviewed by: Electronically Signed by Sharita Butterfield LPN ,OB Nurse navigator

## 2025-01-14 ENCOUNTER — NURSE TRIAGE (OUTPATIENT)
Dept: OTHER | Facility: OTHER | Age: 25
End: 2025-01-14

## 2025-01-14 LAB — BACTERIA UR CULT: NORMAL

## 2025-01-15 LAB
HGB A MFR BLD: 2.3 % (ref 1.8–3.2)
HGB A MFR BLD: 97.7 % (ref 96.4–98.8)
HGB F MFR BLD: 0 % (ref 0–2)
HGB FRACT BLD-IMP: NORMAL
HGB S MFR BLD: 0 %

## 2025-01-15 NOTE — TELEPHONE ENCOUNTER
"Reason for Disposition   [1] Sinus congestion as part of a cold AND [2] present < 10 days   MILD abdominal pain    Answer Assessment - Initial Assessment Questions  1. LOCATION: \"Where does it hurt?\"         Nose and head    2. ONSET: \"When did the sinus pain start?\"  (e.g., hours, days)         Yesterday    3. SEVERITY: \"How bad is the pain?\"   (Scale 1-10; mild, moderate or severe)        Mild    4. RECURRENT SYMPTOM: \"Have you ever had sinus problems before?\" If Yes, ask: \"When was the last time?\" and \"What happened that time?\"         Denies    5. NASAL CONGESTION: \"Is the nose blocked?\" If Yes, ask: \"Can you open it or must you breathe through your mouth?\"        Congestion, can breath but cannot get anything from nose    6. NASAL DISCHARGE: \"Do you have discharge from your nose?\" If so ask, \"What color?\"        Denies getting anything out    7. FEVER: \"Do you have a fever?\" If Yes, ask: \"What is it, how was it measured, and when did it start?\"         Denies    8. OTHER SYMPTOMS: \"Do you have any other symptoms?\" (e.g., sore throat, cough, earache, difficulty breathing)        Itchy throat, sneezing, headache, lower abdomen dull pain. Denies difficulty breathing.    9. PREGNANCY: \"Is there any chance you are pregnant?\" \"When was your last menstrual period?\"        11w1d    Patient called for care advice due to being pregnant and started with lots of sneezing and congestion yesterday.  Stated that she is having sinus pressure and congestion. Unable to get anything out of her nose. Denies difficulty breathing, cough, and fever but has a headache due to sinus congestion. Recommended nasal irrigation, saline spray, humidification, zyrtec, and tylenol for headaches and symptoms. Advised patient that ibuprofen was not safe during pregnancy and to limit tylenol to less then 3000 mg a day. Patient asked if hot tea was safe to drink. Advised patient that herbal tea is generally safe but to make sure to limit caffeine " to less then 200 mg a day, verbalized understanding. Patient also stated that she is having lower abdomen pain that started about 30 minutes ago. It was coming and going but now constant. Started on left lower side towards groin and now on both sides. Feels like a pulling dull pain. Pain improved with standing and less then 5 at this time. Advised patient that she needs to be evaluated if pain becomes moderate/severe again, verbalized understanding. Denies bleeding or leakage of fluid. Stated that she has been eating and drinking well and able to void and have bowel movements without issues. Having intermittent nausea. Educated patient that unisom is safe to take in pregnancy for both nausea and to aid in sleep if she should need it, verbalized understanding and appreciation for information. Advised patient to call back with worsening symptoms, severe headache or abdomen pain, bleeding, or any other concerns, verbalized understanding. Also advised patient that she should be evaluated by her PCP if she gets a fever or starts with ear pain. Reviewed all home care and call back instructions per protocol , patient verbalized understanding and denied further assistance before disconnecting.    Protocols used: Pregnancy - Abdominal Pain Less Than 20 Weeks EGA-Adult-AH, Sinus Pain or Congestion-Adult-AH

## 2025-01-18 PROBLEM — Z87.59 HISTORY OF GESTATIONAL HYPERTENSION: Status: ACTIVE | Noted: 2025-01-18

## 2025-01-19 ENCOUNTER — NURSE TRIAGE (OUTPATIENT)
Dept: OTHER | Facility: OTHER | Age: 25
End: 2025-01-19

## 2025-01-19 ENCOUNTER — TELEPHONE (OUTPATIENT)
Dept: OTHER | Facility: OTHER | Age: 25
End: 2025-01-19

## 2025-01-19 NOTE — TELEPHONE ENCOUNTER
Error   Finasteride Counseling:  I discussed with the patient the risks of use of finasteride including but not limited to decreased libido, decreased ejaculate volume, gynecomastia, and depression. Women should not handle medication.  All of the patient's questions and concerns were addressed. Finasteride Male Counseling: Finasteride Counseling:  I discussed with the patient the risks of use of finasteride including but not limited to decreased libido, decreased ejaculate volume, gynecomastia, and depression. Women should not handle medication.  All of the patient's questions and concerns were addressed.

## 2025-01-19 NOTE — TELEPHONE ENCOUNTER
"Almost 12 weeks pregnant; cold symptoms. Has severe congestion. Hasn't been improving, has been getting worse. Not getting relief from neti pot, warm mist  Reason for Disposition  • Common cold with no complications    Answer Assessment - Initial Assessment Questions  1. ONSET: \"When did the nasal discharge start?\"         Symptoms started on Tuesday    2. AMOUNT: \"How much discharge is there?\"         Difficult to tell; it's hard to break down mucous and get it out. Congested all day.    3. COUGH: \"Do you have a cough?\" If Yes, ask: \"Describe the color of your mucus.\" (e.g., clear, white, yellow, green)        Denies cough; loss of taste and smell.    4. RESPIRATORY DISTRESS: \"Describe your breathing.\"         Has to take her time eating because it is difficult to breathe and eat at the same time    5. FEVER: \"Do you have a fever?\" If Yes, ask: \"What is your temperature, how was it measured, and when did it start?\"        Denies    6. SEVERITY: \"Overall, how bad are you feeling right now?\" (e.g., doesn't interfere with normal activities, staying home from school/work, staying in bed)         Patient states it is difficult to breathe through her nose; having to breathe through her mouth; sleeping ok - takes a little while to fall asleep; drinking fluids ok; urinating ok; no symptoms of dehydration      7. OTHER SYMPTOMS: \"Do you have any other symptoms?\" (e.g., earache, mouth sores, sore throat, wheezing)    Occasional sore throat, random ear ache        8. PREGNANCY: \"Is there any chance you are pregnant?\" \"When was your last menstrual period?\"        11 5/7    Symptoms are not improving from Tuesday.    Protocols used: Common Cold-Adult-    "

## 2025-01-21 ENCOUNTER — ROUTINE PRENATAL (OUTPATIENT)
Facility: HOSPITAL | Age: 25
End: 2025-01-21
Payer: COMMERCIAL

## 2025-01-21 ENCOUNTER — APPOINTMENT (OUTPATIENT)
Dept: LAB | Facility: CLINIC | Age: 25
End: 2025-01-21
Payer: COMMERCIAL

## 2025-01-21 VITALS
SYSTOLIC BLOOD PRESSURE: 120 MMHG | DIASTOLIC BLOOD PRESSURE: 72 MMHG | HEART RATE: 91 BPM | OXYGEN SATURATION: 99 % | BODY MASS INDEX: 23.48 KG/M2 | WEIGHT: 132.5 LBS | HEIGHT: 63 IN

## 2025-01-21 DIAGNOSIS — Z3A.12 12 WEEKS GESTATION OF PREGNANCY: ICD-10-CM

## 2025-01-21 DIAGNOSIS — Z36.9 ENCOUNTER FOR ANTENATAL SCREENING: ICD-10-CM

## 2025-01-21 DIAGNOSIS — Z31.430 ENCOUNTER OF FEMALE FOR TESTING FOR GENETIC DISEASE CARRIER STATUS FOR PROCREATIVE MANAGEMENT: ICD-10-CM

## 2025-01-21 DIAGNOSIS — N91.2 AMENORRHEA: ICD-10-CM

## 2025-01-21 DIAGNOSIS — O36.80X0 ENCOUNTER TO DETERMINE FETAL VIABILITY OF PREGNANCY, SINGLE OR UNSPECIFIED FETUS: ICD-10-CM

## 2025-01-21 DIAGNOSIS — O09.891 PRIOR PREGNANCY COMPLICATED BY PIH, ANTEPARTUM, FIRST TRIMESTER: ICD-10-CM

## 2025-01-21 DIAGNOSIS — Z36.82 ENCOUNTER FOR NUCHAL TRANSLUCENCY TESTING: ICD-10-CM

## 2025-01-21 DIAGNOSIS — Z36.0 ENCOUNTER FOR ANTENATAL SCREENING FOR CHROMOSOMAL ANOMALIES: ICD-10-CM

## 2025-01-21 DIAGNOSIS — Z36.82 ENCOUNTER FOR (NT) NUCHAL TRANSLUCENCY SCAN: Primary | ICD-10-CM

## 2025-01-21 DIAGNOSIS — Z91.89 AT RISK FOR HYPERTENSION: ICD-10-CM

## 2025-01-21 DIAGNOSIS — Z33.1 PREGNANT STATE, INCIDENTAL: ICD-10-CM

## 2025-01-21 DIAGNOSIS — Z87.59 HISTORY OF GESTATIONAL HYPERTENSION: ICD-10-CM

## 2025-01-21 PROCEDURE — 76813 OB US NUCHAL MEAS 1 GEST: CPT | Performed by: OBSTETRICS & GYNECOLOGY

## 2025-01-21 PROCEDURE — 76801 OB US < 14 WKS SINGLE FETUS: CPT | Performed by: OBSTETRICS & GYNECOLOGY

## 2025-01-21 PROCEDURE — 36415 COLL VENOUS BLD VENIPUNCTURE: CPT | Performed by: OBSTETRICS & GYNECOLOGY

## 2025-01-21 PROCEDURE — 99203 OFFICE O/P NEW LOW 30 MIN: CPT | Performed by: OBSTETRICS & GYNECOLOGY

## 2025-01-21 PROCEDURE — 36415 COLL VENOUS BLD VENIPUNCTURE: CPT

## 2025-01-21 RX ORDER — ASPIRIN 81 MG/1
162 TABLET ORAL DAILY
Qty: 90 TABLET | Refills: 2 | Status: SHIPPED | OUTPATIENT
Start: 2025-01-21

## 2025-01-21 NOTE — PROGRESS NOTES
Patient chose to have [facility] [id number] Non-Invasive Prenatal Screen [id number] PLUS w/ SCA, WITH fetal sex.  Patient choose to be billed through insurance.     Patient given brochure and is aware [facility] will contact patient's insurance and coordinate coverage.  Provided [facility] contact information. General inquiries [phone], Cost estimates [phone] and [facility] Billing [phone]. Website [email or web address]     Blood collection tubes labeled with patient identifiers (name, medical record number, and date of birth).     Filled out [facility] order form. Patient chose to have blood drawn in our office at time of visit. NIPS was drawn from left arm with a butterfly needle by Luz Melo .      If patient chose to have blood work drawn at a [facility] lab we requested patient notify MFM (via phone call or [employer] message) when blood collected so office can follow up on results.       Maternal Fetal Medicine will have results in approximately 5-7 business days and will call patient or notify via [employer].  Patient aware viewing lab result online will reveal fetal sex if ordered.    Patient verbalized understanding of all instructions and no questions at this time.

## 2025-01-21 NOTE — PROGRESS NOTES
Neisha presents today for a genetic screening ultrasound.  This is her second pregnancy.  First pregnancy was complicated by gestational hypertension at term.  She had a healthy baby girl born vaginally who weighed 7 pounds 2 ounces.  Other than wisdom tooth extraction, she has no other significant contributory medical, surgical, substance use, or family history.  A review of systems is otherwise negative.    We discussed the options for genetic screening, including but not limited to first trimester screening, second trimester screening, combined first and second trimester screening, noninvasive prenatal screening (NIPS) for patients at high risk and diagnostic screening through the use of CVS and amniocentesis. We discussed the risks and benefits of each approach including the sensitivities and false positive rates as well as the difference between a screening test and a diagnostic test. At the conclusion of our discussion the patient elected noninvasive prenatal screening utilizing the MaterniT 21 plus test. The patient had this blood work drawn in the office.   The results should be available in approximately 7-10 days.    Given the patient's history of prior gestational hypertension, I recommend initiating low dose aspirin therapy.  A recent meta-analysis yielded risk reductions of 24% for preeclampsia, 20% for intrauterine growth restriction, and 14% for  birth, with an absolute risk reduction of 2-5% for preeclampsia, one to 5% for intrauterine growth restriction, and 2-4% for  birth.  In this study, there was no identified risk of harm to the mother or fetus but long-term evidence was somewhat limited.  Given the overall safety profile and risk-benefit analysis, I recommend 162 mg of aspirin be taken Daily and discontinued at around 36 weeks gestation or 2-3 weeks prior to planned delivery.   I reviewed these recommendations with the patient and answered all of her questions to apparent  "satisfaction.    We discussed follow-up in detail and I recommend an anatomy ultrasound be scheduled for 20 weeks gestation.    Thank you very much for allowing us to participate in the care of this very nice patient. Should you have any questions, please do not hesitate to contact me.    Portions of the record may have been created with voice recognition software. Occasional wrong word or \"sound a like\" substitutions may have occurred due to the inherent limitations of voice recognition software. Read the chart carefully and recognize, using context, where substitutions have occurred.  "

## 2025-01-26 LAB
CFDNA.FET/CFDNA.TOTAL SFR FETUS: NORMAL %
CITATION REF LAB TEST: NORMAL
FET 13+18+21+X+Y ANEUP PLAS.CFDNA: NEGATIVE
FET CHR 21 TS PLAS.CFDNA QL: NEGATIVE
FET CHR 21 TS PLAS.CFDNA QL: NEGATIVE
FET MS X RISK WBC.DNA+CFDNA QL: NOT DETECTED
FET SEX PLAS.CFDNA DOSAGE CFDNA: NORMAL
FET TS 13 RISK PLAS.CFDNA QL: NEGATIVE
FET X + Y ANEUP RISK PLAS.CFDNA SEQ-IMP: NOT DETECTED
GA EST FROM CONCEPTION DATE: NORMAL D
GESTATIONAL AGE > 9:: YES
LAB DIRECTOR NAME PROVIDER: NORMAL
LAB DIRECTOR NAME PROVIDER: NORMAL
LABORATORY COMMENT REPORT: NORMAL
LIMITATIONS OF THE TEST: NORMAL
NEGATIVE PREDICTIVE VALUE: NORMAL
PERFORMANCE CHARACTERISTICS: NORMAL
POSITIVE PREDICTIVE VALUE: NORMAL
REF LAB TEST METHOD: NORMAL
SL AMB NOTE:: NORMAL
TEST PERFORMANCE INFO SPEC: NORMAL

## 2025-01-27 ENCOUNTER — RESULTS FOLLOW-UP (OUTPATIENT)
Facility: HOSPITAL | Age: 25
End: 2025-01-27

## 2025-01-27 LAB
CITATION REF LAB TEST: NORMAL
CLINICAL INFO: NORMAL
ETHNIC BACKGROUND STATED: NORMAL
GENE DIS ANL CARRIER INTERP-IMP: NORMAL
GENE MUT TESTED BLD/T: NORMAL
LAB DIRECTOR NAME PROVIDER: NORMAL
REASON FOR REFERRAL (NARRATIVE): NORMAL
RECOMMENDATION PATIENT DOC-IMP: NORMAL
REF LAB TEST METHOD: NORMAL
SERVICE CMNT-IMP: NORMAL
SMN1 GENE MUT ANL BLD/T: NORMAL
SPECIMEN SOURCE: NORMAL

## 2025-01-27 NOTE — RESULT ENCOUNTER NOTE
I have reviewed the results of the NIPS which are low risk.  Please call patient and notify her of these reassuring results if she has not viewed on MyChart. Please ensure she is notified of recommendation of MSAFP to be ordered and followed up through her primary Obstetrician's office.      Thank you, Chacho Oscar MD

## 2025-02-03 LAB
CFTR FULL MUT ANL BLD/T SEQ: NORMAL
CITATION REF LAB TEST: NORMAL
CLINICAL INFO: NORMAL
ETHNIC BACKGROUND STATED: NORMAL
GENE DIS ANL CARRIER INTERP-IMP: NORMAL
INDICATION: NORMAL
LAB DIRECTOR NAME PROVIDER: NORMAL
RECOMMENDATION PATIENT DOC-IMP: NORMAL
REF LAB TEST METHOD: NORMAL
SERVICE CMNT-IMP: NORMAL
SPECIMEN SOURCE: NORMAL

## 2025-02-12 ENCOUNTER — INITIAL PRENATAL (OUTPATIENT)
Dept: OBGYN CLINIC | Facility: CLINIC | Age: 25
End: 2025-02-12

## 2025-02-12 VITALS — DIASTOLIC BLOOD PRESSURE: 66 MMHG | WEIGHT: 136.8 LBS | SYSTOLIC BLOOD PRESSURE: 116 MMHG | BODY MASS INDEX: 24.23 KG/M2

## 2025-02-12 DIAGNOSIS — Z3A.15 15 WEEKS GESTATION OF PREGNANCY: ICD-10-CM

## 2025-02-12 DIAGNOSIS — Z87.59 HISTORY OF GESTATIONAL HYPERTENSION: ICD-10-CM

## 2025-02-12 DIAGNOSIS — Z34.82 ENCOUNTER FOR SUPERVISION OF OTHER NORMAL PREGNANCY IN SECOND TRIMESTER: Primary | ICD-10-CM

## 2025-02-12 PROCEDURE — 87491 CHLMYD TRACH DNA AMP PROBE: CPT

## 2025-02-12 PROCEDURE — 87591 N.GONORRHOEAE DNA AMP PROB: CPT

## 2025-02-12 PROCEDURE — PNV

## 2025-02-12 NOTE — PROGRESS NOTES
Patient is a 26 YO  female presenting to the office at 15w1d for routine OB care.   Patient is feeling well today.   She has been experiencing mild pelvic cramping during and after intercourse x 1 week. Abdomen/pelvis palpated today and fundus is at level of noted discomfort. Cramping is typically very short lived and resolves on its own. We discussed precautions today and s/sx that would warrant RTO or going to L&D.  Fetal heart rate: 140  BP: 116/66  TWlb 12.8oz  Fetal Movement: not feeling yet     Cramping: yes, see above, during intercourse   Bleeding: no  LOF: no  NT/ week scan scheduled: yes  Anatomy scan scheduled: yes, 3/17/25  AFP ordered if indicated: yes  Prenatal labs complete (including Heb B, HIV): yes; date completed 2025  Pap collected: no  GC collected:yes  OK to transfuse and code  Oriented to practice/delivery location.   RTO 4 weeks

## 2025-02-12 NOTE — PROGRESS NOTES
Neisha Atwood is [id number], here for her initial ob appt; pap UTD/GC/c to be collected.  Pt denies any LOF, VB, or CTXs. Pt states that she is having mild cramping during and after intercourse, over the past week.          UA neg/neg

## 2025-02-13 ENCOUNTER — TELEPHONE (OUTPATIENT)
Age: 25
End: 2025-02-13

## 2025-02-13 ENCOUNTER — RESULTS FOLLOW-UP (OUTPATIENT)
Dept: OBGYN CLINIC | Facility: CLINIC | Age: 25
End: 2025-02-13

## 2025-02-13 LAB
C TRACH DNA SPEC QL NAA+PROBE: NEGATIVE
N GONORRHOEA DNA SPEC QL NAA+PROBE: NEGATIVE

## 2025-02-13 NOTE — TELEPHONE ENCOUNTER
Pt calling in stating that she was notified she was up 17lbs in weight from the appt in December, pt stating that she checked on mychart and pt had only gained 8 lbs. Pt is also requesting further information on aspirin and what the possible side effects are to the baby.

## 2025-03-01 ENCOUNTER — NURSE TRIAGE (OUTPATIENT)
Dept: OTHER | Facility: OTHER | Age: 25
End: 2025-03-01

## 2025-03-01 NOTE — TELEPHONE ENCOUNTER
"Reason for Disposition  • Symptoms of a vaginal yeast infection (i.e., white, thick, cottage-cheese-like, itchy, not bad smelling discharge)    Answer Assessment - Initial Assessment Questions  1. DISCHARGE: \"Describe the discharge.\" (e.g., white, yellow, green, gray, foamy, cottage cheese-like)      Pt reports thin, not clumpy, clear but itchy and uncomfortable.  Pt states she has been wearing her regular clothes and she thinks they are too small   2. ODOR: \"Is there a bad odor?\"      No odor  3. ONSET: \"When did the discharge begin?\"      Just started yesterday   4. RASH: \"Is there a rash in that area?\" If Yes, ask: \"Describe it.\" (e.g., redness, blisters, sores, bumps)      No rash or redness   5. ABDOMEN PAIN: \"Are you having any abdomen pain?\" If Yes, ask: \"What does it feel like?\" (e.g., crampy, dull, intermittent, constant)       No abd pain but slight cramping this morning that resolved   6. ABDOMEN PAIN SEVERITY: If present, ask: \"How bad is it?\"  (e.g., Scale 1-10; mild, moderate, or severe)      0 now   7. CAUSE: \"What do you think is causing the discharge?\"      \"I think it is the start of a yeast infection\"   8. OTHER SYMPTOMS: \"Do you have any other symptoms?\" (e.g., fever, itching, vaginal bleeding, pain with urination)      No, just vaginal itching, slight discharge and discomfort from itching   9. BRANDI: \"What date are you expecting to deliver?\"       8/5/25  10. PREGNANCY: \"How many weeks pregnant are you?\"        17 weeks    Protocols used: Pregnancy - Vaginal Discharge-Adult-AH    "

## 2025-03-01 NOTE — TELEPHONE ENCOUNTER
Regarding: Concerns for yeast infection- Pregnant 17w4d  ----- Message from Susi BARILLAS sent at 3/1/2025 10:40 AM EST -----  Concerns for yeast infection- having vaginal itching, has thin vaginal discharge.   Feels the same as prior yeast infections.

## 2025-03-01 NOTE — TELEPHONE ENCOUNTER
Pt called with concerns for yeast infection.  Advice offered per protocol and pt verbalized understanding.  Pt also asking if it is okay to take probiotic during pregnancy and will follow up at next office visit with provider.

## 2025-03-09 ENCOUNTER — NURSE TRIAGE (OUTPATIENT)
Dept: OTHER | Facility: OTHER | Age: 25
End: 2025-03-09

## 2025-03-09 NOTE — TELEPHONE ENCOUNTER
"FOLLOW UP: Advised to call back with new/worsening symptoms, and to follow up with office. (Has an appointment scheduled for 3/11)    REASON FOR CONVERSATION: Abdominal Cramping    SYMPTOMS: Right lower abdominal cramping    OTHER: N/A    DISPOSITION: Home Care        Reason for Disposition   MILD abdominal pain    Answer Assessment - Initial Assessment Questions  1. LOCATION: \"Where does it hurt?\"         Right side of lower abdomen     2. RADIATION: \"Does the pain shoot anywhere else?\" (e.g., chest, back, shoulder)        Denies     3. ONSET: \"When did the pain begin?\" (e.g., minutes, hours or days ago)         About 25 minutes ago     4. ONSET: \"Gradual or sudden onset?\"        Sudden onset     5. PATTERN \"Does the pain come and go, or has it been constant since it started?\"         Comes and goes in waves     6. SEVERITY: \"How bad is the pain?\" \"What does it keep you from doing?\"  (e.g., Scale 1-10; mild, moderate, or severe)        5/10    7. RECURRENT SYMPTOM: \"Have you ever had this type of stomach pain before?\" If Yes, ask: \"When was the last time?\" and \"What happened that time?\"         Denies     8. CAUSE: \"What do you think is causing the stomach pain?\"        Unknown cause     9. RELIEVING/AGGRAVATING FACTORS: \"What makes it better or worse?\" (e.g., antacids, bowel movement, movement)        Has not moved since pain started so unsure     10. OTHER SYMPTOMS: \"Do you have any other symptoms?\" (e.g., back pain, diarrhea, fever, urination pain, vaginal bleeding, vaginal discharge, vomiting)          Denies vaginal bleeding or discharge, denies back pain, denies urinary symptoms, denies fevers, denies other symptoms at this time    11. BRANDI: \"What date are you expecting to deliver?\"          18w5d BRANDI 8/5/25    Protocols used: Pregnancy - Abdominal Pain Less Than 20 Weeks EGA-Adult-AH    "

## 2025-03-10 ENCOUNTER — APPOINTMENT (OUTPATIENT)
Dept: LAB | Facility: AMBULARY SURGERY CENTER | Age: 25
End: 2025-03-10
Payer: COMMERCIAL

## 2025-03-10 DIAGNOSIS — Z3A.15 15 WEEKS GESTATION OF PREGNANCY: ICD-10-CM

## 2025-03-10 PROCEDURE — 36415 COLL VENOUS BLD VENIPUNCTURE: CPT

## 2025-03-10 PROCEDURE — 82105 ALPHA-FETOPROTEIN SERUM: CPT

## 2025-03-11 ENCOUNTER — ROUTINE PRENATAL (OUTPATIENT)
Dept: OBGYN CLINIC | Facility: CLINIC | Age: 25
End: 2025-03-11

## 2025-03-11 VITALS — DIASTOLIC BLOOD PRESSURE: 70 MMHG | BODY MASS INDEX: 24.45 KG/M2 | SYSTOLIC BLOOD PRESSURE: 114 MMHG | WEIGHT: 138 LBS

## 2025-03-11 DIAGNOSIS — Z34.82 ENCOUNTER FOR SUPERVISION OF OTHER NORMAL PREGNANCY, SECOND TRIMESTER: Primary | ICD-10-CM

## 2025-03-11 DIAGNOSIS — Z3A.19 19 WEEKS GESTATION OF PREGNANCY: ICD-10-CM

## 2025-03-11 PROCEDURE — PNV: Performed by: OBSTETRICS & GYNECOLOGY

## 2025-03-11 NOTE — PROGRESS NOTES
25 y.o.   female at 19 wga for PNV. BP : 114/70. TW  + FM, - LOF, - Ctxn, - bleeding  Feeling well.  No complaints  AFP collected yesterday  Level 2 US scheduled  Declines flu vaccine  F/u 4 weeks

## 2025-03-11 NOTE — PROGRESS NOTES
25 y.o.  female at 19w0d (Estimated Date of Delivery: 25) for PNV.      TW.074 kg (17 lb 12.8 oz)    Leakage of fluid: no  Vaginal bleeding: no  Contractions/Cramping: yes, she had sudden sharp pelvic pain on the right side two days ago. The patient did go away after half and hour. She believes it might have been round ligament pain.   Fetal movement: yes    No nausea, vomiting, headache or dizziness.     RTO in 4 weeks.

## 2025-03-13 LAB
2ND TRIMESTER 4 SCREEN SERPL-IMP: NORMAL
AFP ADJ MOM SERPL: 1.16
AFP INTERP AMN-IMP: NORMAL
AFP INTERP SERPL-IMP: NORMAL
AFP INTERP SERPL-IMP: NORMAL
AFP SERPL-MCNC: 60.3 NG/ML
AGE AT DELIVERY: 26.5 YR
GA METHOD: NORMAL
GA: 18.9 WEEKS
IDDM PATIENT QL: NO
MULTIPLE PREGNANCY: NO
NEURAL TUBE DEFECT RISK FETUS: 7366 %

## 2025-03-17 ENCOUNTER — ROUTINE PRENATAL (OUTPATIENT)
Facility: HOSPITAL | Age: 25
End: 2025-03-17
Payer: COMMERCIAL

## 2025-03-17 VITALS
BODY MASS INDEX: 25.12 KG/M2 | HEIGHT: 63 IN | OXYGEN SATURATION: 99 % | WEIGHT: 141.8 LBS | HEART RATE: 91 BPM | SYSTOLIC BLOOD PRESSURE: 120 MMHG | DIASTOLIC BLOOD PRESSURE: 66 MMHG

## 2025-03-17 DIAGNOSIS — Z36.86 ENCOUNTER FOR ANTENATAL SCREENING FOR CERVICAL LENGTH: ICD-10-CM

## 2025-03-17 DIAGNOSIS — Z3A.19 19 WEEKS GESTATION OF PREGNANCY: ICD-10-CM

## 2025-03-17 DIAGNOSIS — Z36.3 ENCOUNTER FOR ANTENATAL SCREENING FOR MALFORMATION USING ULTRASOUND: ICD-10-CM

## 2025-03-17 DIAGNOSIS — Z87.59 HISTORY OF GESTATIONAL HYPERTENSION: Primary | ICD-10-CM

## 2025-03-17 PROCEDURE — 76817 TRANSVAGINAL US OBSTETRIC: CPT | Performed by: OBSTETRICS & GYNECOLOGY

## 2025-03-17 PROCEDURE — 76811 OB US DETAILED SNGL FETUS: CPT | Performed by: OBSTETRICS & GYNECOLOGY

## 2025-03-17 PROCEDURE — 99213 OFFICE O/P EST LOW 20 MIN: CPT | Performed by: OBSTETRICS & GYNECOLOGY

## 2025-03-17 NOTE — LETTER
March 17, 2025     Isabela Nicholas MD  [address] [facility]  [address]  [facility] [address]    Patient: Neisha Atwood   YOB: 2000   Date of Visit: 3/17/2025       Dear Dr. Nicholas:    Thank you for referring Neisha Atwood to me for evaluation. Below are my notes for this consultation.    If you have questions, please do not hesitate to call me. I look forward to following your patient along with you.         Sincerely,        Zoraida Ayala MD        CC: No Recipients    Zoraida Ayala MD  3/17/2025  1:35 PM  Sign when Signing Visit  Neisha Atwood  has no complaints today at 19w6d. She reports fetal movements and does not report any vaginal bleeding or signs of labor.  Her recently completed fetal testing revealed a normal NIPT and MSAFP. She is here today for an ultrasound for fetal anatomy.    Problem list:  History of gestational hypertension diagnosed at delivery.  She is on baby aspirin.  Weight gain so far in pregnancy is 22 pounds.  In her last pregnancy she feels she might of gained about 40 pounds.  She has a blood pressure cuff at home.    Ultrasound findings:  The ultrasound today shows normal interval fetal growth and fluid, normal cervical length, and no malformations were detected.    Pregnancy ultrasound has limitations and is unable to detect all forms of fetal congenital abnormalities.      Follow up recommended:   Further follow-up ultrasounds are recommended at this time.  To lower her risk for gestational hypertension she can continue her aspirin 162 mg daily till 36 weeks and 0 days.  Excessive weight gain in pregnancy is a risk factor for gestational hypertension.  Encouraged her to monitor weight gain in pregnancy.  Recommend the lower end of the 25-35 lbs based on her starting bmi and hx of ghtn. Encouraged 30 min of her size 5 days a week.  Also encouraged close monitoring of her intake.  Caloric intake in the first 2 trimesters is similar to prior to pregnancy.   The last trimester it is recommended she get 300 extra calories a day.  She is aware she can request a nutrition consult if she continues to gain excessive weight.    Pre visit time reviewing her records   5 minutes  Face to face time 10 minutes  Post visit time on documentation of note, updating her problem list, adding orders and prescriptions 5 minutes.  Procedures that were completed today were charged separately.   The level of decision making was low level complexity.    Zoraida Ayala MD

## 2025-03-17 NOTE — PROGRESS NOTES
Ultrasound Probe Disinfection    A transvaginal ultrasound was performed.   Prior to use, disinfection was performed with High Level Disinfection Process ([employer]).  Probe serial number [id number] was used.    Maeve Galeana  03/17/25  8:03 AM

## 2025-04-09 ENCOUNTER — ROUTINE PRENATAL (OUTPATIENT)
Dept: OBGYN CLINIC | Facility: CLINIC | Age: 25
End: 2025-04-09

## 2025-04-09 VITALS — WEIGHT: 144.8 LBS | BODY MASS INDEX: 25.65 KG/M2 | DIASTOLIC BLOOD PRESSURE: 68 MMHG | SYSTOLIC BLOOD PRESSURE: 112 MMHG

## 2025-04-09 DIAGNOSIS — Z3A.23 23 WEEKS GESTATION OF PREGNANCY: ICD-10-CM

## 2025-04-09 DIAGNOSIS — Z87.59 HISTORY OF GESTATIONAL HYPERTENSION: Primary | ICD-10-CM

## 2025-04-09 DIAGNOSIS — Z34.82 ENCOUNTER FOR SUPERVISION OF OTHER NORMAL PREGNANCY, SECOND TRIMESTER: ICD-10-CM

## 2025-04-09 PROCEDURE — PNV: Performed by: OBSTETRICS & GYNECOLOGY

## 2025-04-09 NOTE — PROGRESS NOTES
25 y.o.  female at 23w1d (Estimated Date of Delivery: 25) for PNV.    Pre- Vitals      Flowsheet Row Most Recent Value   Prenatal Assessment    Fetal Heart Rate 145   Movement Present   Prenatal Vitals    Blood Pressure 112/68   Weight - Scale 65.7 kg (144 lb 12.8 oz)   Urine Albumin/Glucose    Dilation/Effacement/Station    Vaginal Drainage    Edema           TW.7 kg (25 lb 12.8 oz)    Cramping/contractions: no  Bleeding: no  LOF: no  FM: yes  28 wk labs ordered: yes  TSH indicated & ordered: no  RH negative - type & screen ordered: no  Prenatal labs complete (including Heb B, HIV): yes completed 2025    Discussed glucola vs fresh test options. Reviewed with flavors typically have food dye additives and comparability between fresh test and lemon lime flavor.     RTO in 4 weeks.

## 2025-04-09 NOTE — PATIENT INSTRUCTIONS
You are due for your next set of labs. We request that you go to the lab to have your blood work done between 26-28 weeks.     Labs ordered include a blood count to check for anemia, a syphilis test which is state mandated and the 1 hour glucose test to screen for diabetes in pregnancy.     If you are on medication for hypothyroidism, we will also check a thyroid level (TSH).     If your blood type is RH negative, we will also check a type and screen and will recommend a Rhogam injection at your 28 week visit.       Glucose Testing Instructions  One-hour glucose tolerance test:    The test should be done at 26-28 weeks pregnant, unless otherwise directed by your physician.   Normally you should be avoiding sugars during pregnancy. Eat normally prior to the test.  You do not need fast. Once you have finished the glucola do NOT EAT or DRINK anything until your blood is drawn.   The glucola will be given to you by the .   You will be required to wait at the lab for one hour after drinking the glucola. After one hour, your blood will be drawn.   If the test is normal, it will be reported via 500px. If it is abnormal, we will notify you and a 3 hour glucose test will be recommended.       You have requested to do your glucose test using the Fresh Test.     ACOG recommends the traditional 1 hr glucola. There are no studies to validate the Fresh Test. It is FDA approved as an alternative.     It appears to have the same load of glucose and therefore should be a reasonable alternative.     Some [facility] sites stock it. It can be ordered online from the company. Please call the [employer] site ahead of time to let them know you will be doing the Fresh Test.         [email or web address]

## 2025-04-30 ENCOUNTER — APPOINTMENT (OUTPATIENT)
Dept: LAB | Facility: HOSPITAL | Age: 25
End: 2025-04-30
Attending: OBSTETRICS & GYNECOLOGY
Payer: COMMERCIAL

## 2025-04-30 ENCOUNTER — RESULTS FOLLOW-UP (OUTPATIENT)
Dept: OBGYN CLINIC | Facility: CLINIC | Age: 25
End: 2025-04-30

## 2025-04-30 DIAGNOSIS — Z34.82 ENCOUNTER FOR SUPERVISION OF OTHER NORMAL PREGNANCY, SECOND TRIMESTER: ICD-10-CM

## 2025-04-30 DIAGNOSIS — Z87.59 HISTORY OF GESTATIONAL HYPERTENSION: ICD-10-CM

## 2025-04-30 DIAGNOSIS — Z3A.23 23 WEEKS GESTATION OF PREGNANCY: ICD-10-CM

## 2025-04-30 LAB
ERYTHROCYTE [DISTWIDTH] IN BLOOD BY AUTOMATED COUNT: 13.2 % (ref 11.6–15.1)
GLUCOSE 1H P 50 G GLC PO SERPL-MCNC: 102 MG/DL (ref 70–134)
HCT VFR BLD AUTO: 36.4 % (ref 34.8–46.1)
HGB BLD-MCNC: 11.5 G/DL (ref 11.5–15.4)
MCH RBC QN AUTO: 28.7 PG (ref 26.8–34.3)
MCHC RBC AUTO-ENTMCNC: 31.6 G/DL (ref 31.4–37.4)
MCV RBC AUTO: 91 FL (ref 82–98)
PLATELET # BLD AUTO: 370 THOUSANDS/UL (ref 149–390)
PMV BLD AUTO: 9.9 FL (ref 8.9–12.7)
RBC # BLD AUTO: 4.01 MILLION/UL (ref 3.81–5.12)
TREPONEMA PALLIDUM IGG+IGM AB [PRESENCE] IN SERUM OR PLASMA BY IMMUNOASSAY: NORMAL
WBC # BLD AUTO: 10.41 THOUSAND/UL (ref 4.31–10.16)

## 2025-04-30 PROCEDURE — 82950 GLUCOSE TEST: CPT

## 2025-04-30 PROCEDURE — 36415 COLL VENOUS BLD VENIPUNCTURE: CPT

## 2025-04-30 PROCEDURE — 86780 TREPONEMA PALLIDUM: CPT

## 2025-04-30 PROCEDURE — 85027 COMPLETE CBC AUTOMATED: CPT

## 2025-05-05 PROBLEM — Z3A.27 27 WEEKS GESTATION OF PREGNANCY: Status: ACTIVE | Noted: 2025-03-11

## 2025-05-08 ENCOUNTER — ROUTINE PRENATAL (OUTPATIENT)
Dept: OBGYN CLINIC | Facility: CLINIC | Age: 25
End: 2025-05-08

## 2025-05-08 VITALS — BODY MASS INDEX: 26.29 KG/M2 | SYSTOLIC BLOOD PRESSURE: 112 MMHG | DIASTOLIC BLOOD PRESSURE: 68 MMHG | WEIGHT: 148.4 LBS

## 2025-05-08 DIAGNOSIS — Z87.59 HISTORY OF GESTATIONAL HYPERTENSION: Primary | ICD-10-CM

## 2025-05-08 DIAGNOSIS — Z34.82 ENCOUNTER FOR SUPERVISION OF OTHER NORMAL PREGNANCY, SECOND TRIMESTER: ICD-10-CM

## 2025-05-08 DIAGNOSIS — Z3A.27 27 WEEKS GESTATION OF PREGNANCY: ICD-10-CM

## 2025-05-08 PROCEDURE — PNV: Performed by: OBSTETRICS & GYNECOLOGY

## 2025-05-08 NOTE — PROGRESS NOTES
25 y.o.  female at 27w2d (Estimated Date of Delivery: 25) for PNV.    Pre- Vitals      Flowsheet Row Most Recent Value   Prenatal Assessment    Fetal Heart Rate 140   Movement Present   Prenatal Vitals    Blood Pressure 112/68   Weight - Scale 67.3 kg (148 lb 6.4 oz)   Urine Albumin/Glucose    Dilation/Effacement/Station    Vaginal Drainage    Edema           TW.3 kg (29 lb 6.4 oz)    28 wk labs reviewed.   Red folder given and reviewed. Discussed Fetal kick counts, PTL/Labor information, Baby & Me Classes.     Consent signed - ok with transfusion, full code.   Current visitor policy reviewed.   Patient plans to breastfeed.   Skin to skin, rooming in, delayed cord clamp, pain management in labor discussed.    TDAP was offered and pt wants to think about it further, CONSIDERING FOR NEXT VISIT.  Rhogam was not indicated.    Leakage of fluid: no  Vaginal bleeding: no  Contractions/Cramping: no  Fetal movement: yes    RTO in 2 weeks.

## 2025-05-21 ENCOUNTER — ROUTINE PRENATAL (OUTPATIENT)
Dept: OBGYN CLINIC | Facility: CLINIC | Age: 25
End: 2025-05-21

## 2025-05-21 VITALS — WEIGHT: 153 LBS | DIASTOLIC BLOOD PRESSURE: 62 MMHG | BODY MASS INDEX: 27.1 KG/M2 | SYSTOLIC BLOOD PRESSURE: 118 MMHG

## 2025-05-21 DIAGNOSIS — Z3A.29 29 WEEKS GESTATION OF PREGNANCY: ICD-10-CM

## 2025-05-21 DIAGNOSIS — Z34.83 ENCOUNTER FOR SUPERVISION OF NORMAL PREGNANCY IN MULTIGRAVIDA IN THIRD TRIMESTER: Primary | ICD-10-CM

## 2025-05-21 PROCEDURE — PNV: Performed by: OBSTETRICS & GYNECOLOGY

## 2025-05-21 NOTE — PATIENT INSTRUCTIONS
The Third Trimester  (28-42 weeks)  YOUR BABY   * your baby sucks its thumb now!   * your baby can hear voices and respond to touch…..so talk to him or her!!   * your baby’s brain grows and develops most in the last 2 months of pregnancy   * baby’s head and bones are soft and flexible so they can fit through the birth canal   * baby’s movements change towards the end of pregnancy because there is less room for kicking and stretching in your belly   * baby’s lungs are not fully developed and completely ready to breathe on their own until the last 3-4 weeks before your due date    YOUR BODY   * your belly is growing a lot now   * it may become more difficult to sleep well at night or to be as active as you usually are   * you may sweat more than usual   * you will become more off-balance……be careful not to fall!!   * you may develop hemorrhoids (which can be painful and make it difficult to sit down)   * the last two months of pregnancy can become very uncomfortable……with backaches, headaches, and heartburn   * you can start to have contractions…….as long as they are irregular and less than 5 per hour, this is a normal part of your body getting ready to have a baby   * your cervix may start to thin out and open up……to get ready for delivery   * you may find yourself needing to “pee” very often…….because baby is pressing on your bladder so much   * you may get out of breathe more quickly than usual      FETAL KICK COUNTS    In the third trimester (after 28 weeks gestation) you should be performing fetal kick counts every day.  Your baby should move at least 10 times in 2 hours during an active time, once a day.    Choose atime of day when your baby is most active.  Try to do this around the same time each day.  Get into a comfortable position and then write down the time your baby first moves.  Count each movement until the baby moves 10 times.  These movements include kicks, punches, nudges, flutters, or rolls.  This  can take anywhere from 5 minutes to 2 hours.  Write down the time you feel the baby's 10th movement.    If 2 hours has passed and your baby has not moved at least 10 times, you should CALL THE OFFICE RIGHT AWAY.  [phone]          PREMATURE LABOR     When to call [phone]:  * I need to call immediately if I have even a small amount of LIQUID leaking from my vagina, with or without contractions.   * I need to call if I am BLEEDING from my vagina.   * I need to call if I am feeling CRAMPING that continues after drinking 2-3 glasses of water and lying down on my side for one hour and that feels like I am having a period.   * I need to call if I feel CONTRACTIONS  more than 4 times in an hour that feels like the baby is “balling up” even after I try drinking 2-3 glasses of water and lying down on my side for an hour.   * I need to call if I notice a change in my vaginal DISCHARGE.   * I need to call if I am feeling PELVIC PRESSURE  that feels like the baby is pushing down into my vagina and lasts more than an hour.   * I need to call if I have LOW BACKACHE which is new and near my tailbone.  It may either come and go several times during an hour or stay there constantly.          PRE-ECLAMPSIA     What is it?   Pre-eclampsia is a serious disease that can occur during pregnancy related to high blood pressures.  It can happen to any woman.     Why should I care?   Women who develop pre-eclampsia have serious risks which can include seizures, stroke, organ damage, premature birth of their baby.  In the very worst cases, it can cause death of the mother and/or their baby.     What should I pay attention to?   Signs and symptoms of pre-eclampsia can include:   * Severe swelling of face or hands    * A headache that will not go away even after you have taken Tylenol   * Seeing spots or changes in eyesight    * Pain in the upper abdomen or shoulder    * New nausea and vomiting (in the second half of pregnancy)    *  Sudden weight gain    * Difficulty breathing     What should I do?   If you experience any of the above symptoms of pre-eclampsia, contact your OB provider.  Finding pre-eclampsia early is important for you and your baby.  Call us at [phone]      BREASTFEEDING     BENEFITS FOR BABIES   * stronger immune systems (less allergies, eczema, asthma, and childhood cancers)   * less diarrhea and constipation or other GI diseases   * fewer colds and ear infections   * better vision and teeth (fewer cavities)   * improves IQ   * lower rates of diabetes and obesity in childhood     BENEFITS FOR MOMS   * promotes faster weight loss after delivery   * lower risk for postpartum depression   * lower risk for breast, uterine, and ovarian cancers   * lower risk for osteoporosis developing with age   * easier than formula - is always right with you, clean, and the right temperature   * less expensive than formula……it’s FREE !!!!     KEYS TO SUCCESSFUL BREASTFEEDING   * keep baby skin-to-skin until after first feeding event   * keep baby in your room with you during your hospital stay after delivery   * avoid any bottle feedings (unless medically necessary)   * limit the use of pacifiers and swaddling   * ask for help if you are having any issues……lactation consultants (who specialize in breastfeeding) are available to help you   * a healthy diet for mom……eating a variety of foods and portions in moderation    THINGS YOU SHOULD KNOW ABOUT BREASTFEEDING   * most medications are considered compatible with breastfeeding by the American Academy of Pediatrics, but you should check with your health care provider or lactation consultant prior to taking a new medication……just to be sure it is safe   * alcohol (beer, wine, liquor) can be passed from mother to baby through breast milk……an occasional, social drink is deemed acceptable by the American Academy of Pediatrics…..more than that should be avoided   * breastfeeding is NOT an  effective method of birth control   * nicotine (in cigarettes) can pass from mother to baby through breast milk…..however, for mothers who smoke, it is still healthier to breastfeed than use formula   * caffeine should be limited to 1-3 cups per day……includes coffee, soda, energy drinks         PERINEAL / VAGINAL MASSAGE    What can I do now to decrease my chances of tearing during delivery?  Massaging around the vaginal opening by you (or your partner), either antepartum (before birth) or during the second stage of labor, can reduce the likelihood of perineal tearing during childbirth.  Likewise, the use of warm packs held on the perineum during the pushing stage of labor can reduce the severity of your tear.  This will happen during the pushing stage of labor.  At home, you can also help reduce the chances of injury that may occur during the birth of your child through perineal massage.    When should I do this?  Starting around or shortly after 34 weeks of pregnancy, you or your partner should provide 5-10 minutes of vaginal massage 1-4 times per week.    How?  Use either almond, coconut, or olive oil and water mixture on 1 or 2 fingers (depending on comfort).  Insert finger(s) 3-5cm into the vagina.  Apply sweeping downward/sideward pressure from 3 to 9 o'clock for 5-10 minutes, 1-4 times per week.          WARNING SIGNS DURING PREGNANCY  Call our office at [phone] if you experience any of the followin. Vaginal bleeding  2. Sharp abdominal pain that does not go away  3. Fever (more than 100.4 and is not relieved by Tylenol)  4. Persistent vomiting lasting greater than 24 hours  5. Chest pain   6. Pain or burning when you urinate  7. Severe headache that doesn't resolve with Tylenol  8. Blurred vision or seeing spots in your vision  9. Sudden swelling of your face or hands  10. Redness, swelling or pain in a leg  11. A sudden weight gain in just a few days  12. Decrease in your baby's movement (after  28 weeks or the 6th month of pregnancy)  13. A loss of watery fluid from your vagina - can be a gush, a trickle or continuous wetness  14. After 20 weeks of pregnancy, rhythmic cramping (greater than 4 per hour) or menstrual like low/pelvic pain          VACCINES IN PREGNANCY    TDAP  Whooping cough (or pertussis) can be serious for anyone, but for your , it can be life-threatning.  Up to 20 babies die each year in the [place] Due to whooping cough.  About half of babies younger than 1 year old who get whooping cough need treatment in the hospital.  The younger the baby is when he or she gets whooping cough, the more likely he or she will need to be treated in a hospital.  When you receive the whooping cough vaccine (Tdap) during your pregnancy, your body will create protective antibodies and pass some of them to your baby before birth.  These antibodies can help protect your baby from getting whooping cough until they are old enough to be vaccinated themselves (usually around 6 months of age).    INFLUENZA  Changes in your immune, heart, and lung functions during pregnancy make you more likely to get seriously ill from the flu.  Catching the flu also increases your chances for serious problems for your developing baby, including premature labor and delivery.  It is recommended that all women who are pregnant during flu season should receive an influenza vaccine.

## 2025-05-21 NOTE — PROGRESS NOTES
25 y.o.  female at 29w1d (Estimated Date of Delivery: 25) for PNV.    Pre- Vitals      Flowsheet Row Most Recent Value   Prenatal Assessment    Fetal Heart Rate 140   Movement Present   Prenatal Vitals    Blood Pressure 118/62   Weight - Scale 69.4 kg (153 lb)   Urine Albumin/Glucose    Dilation/Effacement/Station    Vaginal Drainage    Edema           TWG: 15.4 kg (34 lb)    Leakage of fluid: no  Vaginal bleeding: no  Contractions/Cramping: no  Fetal movement: yes  TDAP vaccination recommendations reviewed with patient for prevention of pertussis via passive immunity to fetus.   RTO in 2 weeks.        Declines TDAP

## 2025-06-03 ENCOUNTER — ROUTINE PRENATAL (OUTPATIENT)
Dept: OBGYN CLINIC | Facility: CLINIC | Age: 25
End: 2025-06-03

## 2025-06-03 VITALS — WEIGHT: 154 LBS | DIASTOLIC BLOOD PRESSURE: 60 MMHG | BODY MASS INDEX: 27.28 KG/M2 | SYSTOLIC BLOOD PRESSURE: 104 MMHG

## 2025-06-03 DIAGNOSIS — Z34.83 ENCOUNTER FOR SUPERVISION OF NORMAL PREGNANCY IN MULTIGRAVIDA IN THIRD TRIMESTER: Primary | ICD-10-CM

## 2025-06-03 DIAGNOSIS — Z3A.31 31 WEEKS GESTATION OF PREGNANCY: ICD-10-CM

## 2025-06-03 PROCEDURE — PNV

## 2025-06-03 NOTE — PROGRESS NOTES
Patient is a 26 YO  female presenting to the office at 31w0d for routine OB care.   Patient is feeling well today.   Declines tdap  Fetal heart rate: 140  BP: 104/60  TWlb  Fetal Movement: yes, good movement   LOF: no  VB: no  CTX: no  Reviewed precautions  Call for concerns  RTO 2 weeks

## 2025-06-18 ENCOUNTER — TELEPHONE (OUTPATIENT)
Age: 25
End: 2025-06-18

## 2025-06-18 NOTE — TELEPHONE ENCOUNTER
Patient called and canceled her ob appointment for this morning and is seeing if she can come in today before 2 at either location.  I do not see any openings until next week. She can be reached at [phone] thank you

## 2025-06-20 ENCOUNTER — ROUTINE PRENATAL (OUTPATIENT)
Dept: OBGYN CLINIC | Facility: CLINIC | Age: 25
End: 2025-06-20

## 2025-06-20 VITALS — DIASTOLIC BLOOD PRESSURE: 70 MMHG | BODY MASS INDEX: 27.63 KG/M2 | SYSTOLIC BLOOD PRESSURE: 104 MMHG | WEIGHT: 156 LBS

## 2025-06-20 DIAGNOSIS — Z34.83 ENCOUNTER FOR SUPERVISION OF NORMAL PREGNANCY IN MULTIGRAVIDA IN THIRD TRIMESTER: Primary | ICD-10-CM

## 2025-06-20 DIAGNOSIS — Z3A.33 33 WEEKS GESTATION OF PREGNANCY: ICD-10-CM

## 2025-06-20 PROCEDURE — PNV: Performed by: OBSTETRICS & GYNECOLOGY

## 2025-06-20 NOTE — PROGRESS NOTES
25 y.o.  female at 33w3d (Estimated Date of Delivery: 25) for PNV.    Pre- Vitals      Flowsheet Row Most Recent Value   Prenatal Assessment    Movement Present   Prenatal Vitals    Blood Pressure 104/70   Weight - Scale 70.8 kg (156 lb)   Urine Albumin/Glucose    Dilation/Effacement/Station    Vaginal Drainage    Edema           TW.8 kg (37 lb)    Leakage of fluid: no  Vaginal bleeding: no  Contractions/Cramping: no  Fetal movement: yes    RTO in 2 weeks.

## 2025-07-02 ENCOUNTER — ROUTINE PRENATAL (OUTPATIENT)
Dept: OBGYN CLINIC | Facility: CLINIC | Age: 25
End: 2025-07-02

## 2025-07-02 VITALS — SYSTOLIC BLOOD PRESSURE: 100 MMHG | DIASTOLIC BLOOD PRESSURE: 64 MMHG | BODY MASS INDEX: 28.17 KG/M2 | WEIGHT: 159 LBS

## 2025-07-02 DIAGNOSIS — Z34.83 ENCOUNTER FOR SUPERVISION OF NORMAL PREGNANCY IN MULTIGRAVIDA IN THIRD TRIMESTER: ICD-10-CM

## 2025-07-02 DIAGNOSIS — Z3A.33 33 WEEKS GESTATION OF PREGNANCY: Primary | ICD-10-CM

## 2025-07-02 PROCEDURE — PNV: Performed by: OBSTETRICS & GYNECOLOGY

## 2025-07-02 NOTE — LETTER
To Whom It May Concern,     Tyler Ulloa partner is currently pregnant with an estimated due date of 08/05/2025.   Please reach out to the office with any questions or concerns.     Sincerely,     Ruthy Bruce MD

## 2025-07-02 NOTE — PROGRESS NOTES
3rd Trimester Visit  Name: Neisha Atwood      : 2000      MRN: [id number]  Encounter Provider: Ruthy Lala MD  Encounter Date: 2025   Encounter department: [facility]    25 y.o.  at 35w1d presenting for routine OB visit at 35w1d.:  Assessment & Plan  Encounter for supervision of normal pregnancy in multigravida in third trimester         33 weeks gestation of pregnancy           Reviewed premature labor precautions and fetal kick counts.  Advised to continue medications and return in 1 weeks.    History of Present Illness     She reports no nausea or vomiting. No headache or dizziness.   Denies uterine contractions.  Denies vaginal bleeding or leaking of fluid.  Reports adequate fetal movement of at least 10 movements in 2 hours once daily.       Current Outpatient Medications   Medication Instructions    aspirin (ECOTRIN LOW STRENGTH) 162 mg, Oral, Daily    cholecalciferol (VITAMIN D3) 400 Units, Daily    Prenatal Vit-Iron Carbonyl-FA (prenatal multivitamin) TABS 1 tablet, Daily     Objective   /64   Wt 72.1 kg (159 lb)   LMP 10/11/2024 (Exact Date)   BMI 28.17 kg/m²      BP: Blood Pressure: 100/64  Wt: 72.1 kg (159 lb); Body mass index is 28.17 kg/m².; TWG=18.1 kg (40 lb)  Fetal Heart Rate: 140;      Abdomen: ”soft”,”non tender”        pregnancy Problems (from 24 to present)       Problem Noted Diagnosed Resolved    33 weeks gestation of pregnancy 3/11/2025 by Destinee Curry MD  No    Encounter for supervision of normal pregnancy in multigravida in third trimester 3/11/2025 by Destinee Curry MD  No

## 2025-07-08 ENCOUNTER — NURSE TRIAGE (OUTPATIENT)
Dept: OTHER | Facility: OTHER | Age: 25
End: 2025-07-08

## 2025-07-08 NOTE — TELEPHONE ENCOUNTER
"REASON FOR CONVERSATION: Abdominal Pain Pregnant    SYMPTOMS: Right abdominal tightening, feeling fetal movement less but unable to provide actual kick count     OTHER HEALTH INFORMATION:  36w 0d BRANDI 25    PROTOCOL DISPOSITION: Go to [facility] (or PCP Triage)    CARE ADVICE PROVIDED: Discussed with on call provider who recommended to monitor as these may be contractions. If they become 5 minutes apart for 2 hours, or if kick counts are less than 5 in 1 hour or less than 10 in 2 hours, call back to present to L&D triage. Patient verbalized understanding.     PRACTICE FOLLOW-UP: None at this time      Reason for Disposition   [1] MILD abdominal pain (e.g., doesn't interfere with normal activities) or tightening AND [2] constant AND [3] present > 2 hours    Answer Assessment - Initial Assessment Questions  1. LOCATION: \"Where does it hurt?\"         Right middle sided abdominal tightening     2. RADIATION: \"Does the pain shoot anywhere else?\" (e.g., chest, back)        Denies     3. ONSET: \"When did the pain begin?\" (Minutes, hours or days ago)         Started today around 12 PM     4. SUDDEN: \"Gradual or sudden onset?\"        Sudden     5. PATTERN: \"Does the pain come and go, or has it been constant since it started?\"         Comes and goes, lasts a few minutes at a time happening at random times     6. SEVERITY: \"How bad is the pain?\" \"What does it keep you from doing?\"  (e.g., Scale 1-10; mild, moderate, or severe)        Denies pain but tightness is about an 8/10     7. RECURRENT SYMPTOM: \"Have you ever had this type of stomach pain before?\" If Yes, ask: \"When was the last time?\" and \"What happened that time?\"         Denies     8. CAUSE: \"What do you think is causing the stomach pain?        Denies heavy lifting or strenuous activity, new stress recently that may be contributing to symptoms     9. RELIEVING/AGGRAVATING FACTORS: \"What makes it better or worse?\" (e.g., antacids, bowel movement, movement)      " "  Worse with activity, improves with rest     10. FETAL MOVEMENT: \"Has the baby's movement decreased or changed significantly from normal?\"          Decreased fetal movement today     11. OTHER SYMPTOMS: \"Do you have any other symptoms?\" (e.g., back pain, contractions, diarrhea, fever, headache, urination pain, vaginal bleeding, vaginal discharge, vomiting)          Denies vaginal bleeding, no leakage of fluid, denies new swelling, denies fever, urinary symptoms, or other symptoms.     12. BRANDI: \"What date are you expecting to deliver?\"          8/5/25    Protocols used: Pregnancy - Abdominal Pain Greater Than 20 Weeks EGA-Adult-AH    "

## 2025-07-10 ENCOUNTER — PATIENT MESSAGE (OUTPATIENT)
Dept: OBGYN CLINIC | Facility: CLINIC | Age: 25
End: 2025-07-10

## 2025-07-18 ENCOUNTER — ROUTINE PRENATAL (OUTPATIENT)
Dept: OBGYN CLINIC | Facility: CLINIC | Age: 25
End: 2025-07-18

## 2025-07-18 VITALS — BODY MASS INDEX: 29.05 KG/M2 | WEIGHT: 164 LBS | SYSTOLIC BLOOD PRESSURE: 112 MMHG | DIASTOLIC BLOOD PRESSURE: 68 MMHG

## 2025-07-18 DIAGNOSIS — Z34.83 ENCOUNTER FOR SUPERVISION OF NORMAL PREGNANCY IN MULTIGRAVIDA IN THIRD TRIMESTER: ICD-10-CM

## 2025-07-18 DIAGNOSIS — Z3A.37 37 WEEKS GESTATION OF PREGNANCY: Primary | ICD-10-CM

## 2025-07-18 DIAGNOSIS — Z87.59 HISTORY OF GESTATIONAL HYPERTENSION: ICD-10-CM

## 2025-07-18 PROCEDURE — PNV: Performed by: OBSTETRICS & GYNECOLOGY

## 2025-07-18 PROCEDURE — 87591 N.GONORRHOEAE DNA AMP PROB: CPT | Performed by: OBSTETRICS & GYNECOLOGY

## 2025-07-18 PROCEDURE — 87491 CHLMYD TRACH DNA AMP PROBE: CPT | Performed by: OBSTETRICS & GYNECOLOGY

## 2025-07-18 PROCEDURE — 87150 DNA/RNA AMPLIFIED PROBE: CPT | Performed by: OBSTETRICS & GYNECOLOGY

## 2025-07-18 NOTE — PROGRESS NOTES
3rd Trimester Visit  Name: Neisha Atwood      : 2000      MRN: [id number]  Encounter Provider: Maryjo Mims DO  Encounter Date: 2025   Encounter department: [facility]    25 y.o.  at 37w3d presenting for routine OB visit at 37w3d.:  Assessment & Plan  37 weeks gestation of pregnancy    Orders:    Strep B DNA probe, amplification    Chlamydia/GC amplified DNA by PCR    History of gestational hypertension    Orders:    Strep B DNA probe, amplification    Chlamydia/GC amplified DNA by PCR    Encounter for supervision of normal pregnancy in multigravida in third trimester    Orders:    Strep B DNA probe, amplification    Chlamydia/GC amplified DNA by PCR          Reviewed premature labor precautions and fetal kick counts.  Advised to continue medications and return in 1 weeks.  Discussed IOL after EDC if not delivered, prefers spontaneous labor.     History of Present Illness     She reports pelvic pressure. BH contractions.   Denies uterine contractions.  Denies vaginal bleeding or leaking of fluid.  Reports adequate fetal movement of at least 10 movements in 2 hours once daily.    Medical History Reviewed by provider this encounter:  Tobacco  Allergies  Meds  Problems  Med Hx  Surg Hx  Fam Hx     .     Current Outpatient Medications   Medication Instructions    cholecalciferol (VITAMIN D3) 400 Units, Daily    Prenatal Vit-Iron Carbonyl-FA (prenatal multivitamin) TABS 1 tablet, Daily     Objective   /68   Wt 74.4 kg (164 lb)   LMP 10/11/2024 (Exact Date)   BMI 29.05 kg/m²      BP: Blood Pressure: 112/68  Wt: 74.4 kg (164 lb); Body mass index is 29.05 kg/m².; TWG=20.4 kg (45 lb)  Fetal Heart Rate: 150;      Abdomen: soft, non tender.   SVE 1/50/-2, med/mid    GBS done: Yes  PCN allergy: No  Chlamydia/gonorrhea swab done: Yes  Delivery plan: prefers spontaneous labor, discussed IOL after EDC if still pregnant.             pregnancy Problems (from  12/20/24 to present)       Problem Noted Diagnosed Resolved    33 weeks gestation of pregnancy 3/11/2025 by Destinee Curry MD  No    Encounter for supervision of normal pregnancy in multigravida in third trimester 3/11/2025 by Destinee Curry MD  No

## 2025-07-18 NOTE — PATIENT INSTRUCTIONS
LABOR PRECAUTIONS  Call our office at [phone] for any of the following:    * I need to call immediately I if I have even a small amount of LIQUID  leaking from my vagina, with or without contractions.   * I need to call if I am BLEEDING an amount equal to or more than a period.  A small amount of bloody vaginal discharge is normal at the end of the pregnancy.   * I need to call if I am having CONTRACTIONS  every five minutes for at least an hour.  I will need a watch in order to time them.  I should time them from the beginning of one contraction until the beginning of the next one.   * I need to call BEFORE  I go to the hospital.   * I need to have a plan for TRANSPORTATION  to get to the hospital when I am in labor.  Labor is generally not an emergency which requires an ambulance.          FETAL KICK COUNTS    In the third trimester (after 28 weeks gestation) you should be performing fetal kick counts every day.  Your baby should move at least 10 times in 2 hours during an active time, once a day.    Choose atime of day when your baby is most active.  Try to do this around the same time each day.  Get into a comfortable position and then write down the time your baby first moves.  Count each movement until the baby moves 10 times.  These movements include kicks, punches, nudges, flutters, or rolls.  This can take anywhere from 5 minutes to 2 hours.  Write down the time you feel the baby's 10th movement.    If 2 hours has passed and your baby has not moved at least 10 times, you should CALL THE OFFICE RIGHT AWAY.  [phone]        PERINEAL / VAGINAL MASSAGE    What can I do now to decrease my chances of tearing during delivery?  Massaging around the vaginal opening by you (or your partner), either antepartum (before birth) or during the second stage of labor, can reduce the likelihood of perineal tearing during childbirth.  Likewise, the use of warm packs held on the perineum during the pushing stage of  labor can reduce the severity of your tear.  This will happen during the pushing stage of labor.  At home, you can also help reduce the chances of injury that may occur during the birth of your child through perineal massage.    When should I do this?  Starting around or shortly after 34 weeks of pregnancy, you or your partner should provide 5-10 minutes of vaginal massage 1-4 times per week.    How?  Use either almond, coconut, or olive oil and water mixture on 1 or 2 fingers (depending on comfort).  Insert finger(s) 3-5cm into the vagina.  Apply sweeping downward/sideward pressure from 3 to 9 o'clock for 5-10 minutes, 1-4 times per week.        GROUP B STREP    Group B Strep (GBS) is a common vaginal bacteria.  Approximately 25% of women normally have GBS bacteria present in the vagina.  It is NOT a sexually-transmitted infection.  In fact, it is not an infection AT ALL!  It is just a normal vaginal bacteria for many women.    However, the GBS bacteria can be dangerous to a  baby if the baby is exposed to that particular bacteria during labor and birth AND develops an infection from it.  The likelihood of a  GBS infection for a woman who has GBS bacteria in the vagina is about 1%-2%.  But if it does occur, a baby could become severely ill.    For this reason, we do a vaginal culture (Q-tip swab of the vagina and rectum) for ALL pregnant women at approximately 36 weeks of pregnancy.  If the culture shows that there is GBS bacteria present, it is NOT a reason to panic!  Because in this situation we will give this woman antibiotics through her IV while she is in labor.  When a mother is treated with antibiotics during labor and delivery, her baby ALMOST NEVER becomes infected with GBS bacteria.

## 2025-07-19 LAB
C TRACH DNA SPEC QL NAA+PROBE: NEGATIVE
N GONORRHOEA DNA SPEC QL NAA+PROBE: NEGATIVE

## 2025-07-21 LAB — GP B STREP DNA SPEC QL NAA+PROBE: NEGATIVE

## 2025-07-25 ENCOUNTER — ROUTINE PRENATAL (OUTPATIENT)
Dept: OBGYN CLINIC | Facility: CLINIC | Age: 25
End: 2025-07-25

## 2025-07-25 VITALS — SYSTOLIC BLOOD PRESSURE: 100 MMHG | DIASTOLIC BLOOD PRESSURE: 60 MMHG | BODY MASS INDEX: 28.52 KG/M2 | WEIGHT: 161 LBS

## 2025-07-25 DIAGNOSIS — Z87.59 HISTORY OF GESTATIONAL HYPERTENSION: Primary | ICD-10-CM

## 2025-07-25 DIAGNOSIS — Z3A.38 38 WEEKS GESTATION OF PREGNANCY: ICD-10-CM

## 2025-07-25 PROCEDURE — PNV: Performed by: OBSTETRICS & GYNECOLOGY

## 2025-07-25 NOTE — PROGRESS NOTES
Full Term Visit  Name: Neisha Atwood      : 2000      MRN: [id number]  Encounter Provider: Ruthy Lala MD  Encounter Date: 2025   Encounter department: [facility]    25 y.o.  at 38w3d presenting for routine OB visit at 38w3d.:  Assessment & Plan  38 weeks gestation of pregnancy         History of gestational hypertension             Reviewed labor precautions and fetal kick counts as well as pre-eclampsia warning signs.  Reviewed perineal massage for decreasing risk of perineal lacerations during delivery.  Advised to continue medications and return in 1 week.    History of Present Illness     She reports no questions or concerns.  Denies uterine contractions.  Denies vaginal bleeding or leaking of fluid.  Reports adequate fetal movement of at least 10 movements in 2 hours once daily.       Current Outpatient Medications   Medication Instructions    cholecalciferol (VITAMIN D3) 400 Units, Daily    Prenatal Vit-Iron Carbonyl-FA (prenatal multivitamin) TABS 1 tablet, Daily     Objective   /60   Wt 73 kg (161 lb)   LMP 10/11/2024 (Exact Date)   BMI 28.52 kg/m²      BP: Blood Pressure: 100/51714/60  Wt: 73 kg (161 lb); Body mass index is 28.52 kg/m².; TWG=19.1 kg (42 lb)  Fetal Heart Rate: 140;    SVE today:   /  / - declined exam today     Abdomen: ”soft”,”non tender”    pregnancy Problems (from 24 to present)       Problem Noted Diagnosed Resolved    37 weeks gestation of pregnancy 3/11/2025 by Destinee Curry MD  No    Encounter for supervision of normal pregnancy in multigravida in third trimester 3/11/2025 by Destinee Curry MD  No

## 2025-07-29 ENCOUNTER — NURSE TRIAGE (OUTPATIENT)
Age: 25
End: 2025-07-29

## 2025-07-29 ENCOUNTER — NURSE TRIAGE (OUTPATIENT)
Dept: OTHER | Facility: OTHER | Age: 25
End: 2025-07-29

## 2025-07-30 ENCOUNTER — HOSPITAL ENCOUNTER (INPATIENT)
Facility: HOSPITAL | Age: 25
LOS: 1 days | Discharge: HOME | End: 2025-07-31
Attending: OBSTETRICS & GYNECOLOGY | Admitting: OBSTETRICS & GYNECOLOGY
Payer: COMMERCIAL

## 2025-07-30 ENCOUNTER — ANESTHESIA (INPATIENT)
Dept: ANESTHESIOLOGY | Facility: HOSPITAL | Age: 25
End: 2025-07-30
Payer: COMMERCIAL

## 2025-07-30 ENCOUNTER — ANESTHESIA EVENT (INPATIENT)
Dept: ANESTHESIOLOGY | Facility: HOSPITAL | Age: 25
End: 2025-07-30
Payer: COMMERCIAL

## 2025-07-30 PROBLEM — O47.9 UTERINE CONTRACTIONS DURING PREGNANCY: Status: RESOLVED | Noted: 2025-07-30 | Resolved: 2025-07-30

## 2025-07-30 PROBLEM — O47.9 UTERINE CONTRACTIONS DURING PREGNANCY: Status: ACTIVE | Noted: 2025-07-30

## 2025-07-30 PROBLEM — Z3A.39 39 WEEKS GESTATION OF PREGNANCY: Status: ACTIVE | Noted: 2025-03-11

## 2025-07-30 RX ORDER — LIDOCAINE HYDROCHLORIDE AND EPINEPHRINE 15; 5 MG/ML; UG/ML
INJECTION, SOLUTION EPIDURAL
Status: COMPLETED | OUTPATIENT
Start: 2025-07-30 | End: 2025-07-30

## 2025-07-30 RX ADMIN — LIDOCAINE HYDROCHLORIDE AND EPINEPHRINE 3 ML: 15; 5 INJECTION, SOLUTION EPIDURAL at 07:27

## 2025-07-30 RX ADMIN — LIDOCAINE HYDROCHLORIDE AND EPINEPHRINE 2 ML: 15; 5 INJECTION, SOLUTION EPIDURAL at 07:33

## 2025-08-21 ENCOUNTER — TELEPHONE (OUTPATIENT)
Dept: POSTPARTUM | Facility: CLINIC | Age: 25
End: 2025-08-21